# Patient Record
Sex: FEMALE | Race: WHITE | Employment: UNEMPLOYED | URBAN - METROPOLITAN AREA
[De-identification: names, ages, dates, MRNs, and addresses within clinical notes are randomized per-mention and may not be internally consistent; named-entity substitution may affect disease eponyms.]

---

## 2022-07-07 ENCOUNTER — INITIAL PRENATAL (OUTPATIENT)
Dept: OBGYN CLINIC | Age: 37
End: 2022-07-07
Payer: COMMERCIAL

## 2022-07-07 VITALS — WEIGHT: 138.2 LBS | BODY MASS INDEX: 25.43 KG/M2 | HEIGHT: 62 IN

## 2022-07-07 DIAGNOSIS — O36.80X0 ENCOUNTER TO DETERMINE FETAL VIABILITY OF PREGNANCY, SINGLE OR UNSPECIFIED FETUS: ICD-10-CM

## 2022-07-07 DIAGNOSIS — Z3A.09 9 WEEKS GESTATION OF PREGNANCY: ICD-10-CM

## 2022-07-07 DIAGNOSIS — F41.9 ANXIETY AND DEPRESSION: ICD-10-CM

## 2022-07-07 DIAGNOSIS — Z82.79 FAMILY HISTORY OF CONGENITAL HEART DEFECT: ICD-10-CM

## 2022-07-07 DIAGNOSIS — F32.A ANXIETY AND DEPRESSION: ICD-10-CM

## 2022-07-07 DIAGNOSIS — Z32.01 PREGNANCY TEST POSITIVE: ICD-10-CM

## 2022-07-07 DIAGNOSIS — Z34.81 PRENATAL CARE, SUBSEQUENT PREGNANCY, FIRST TRIMESTER: Primary | ICD-10-CM

## 2022-07-07 PROBLEM — O98.319 GENITAL HERPES AFFECTING PREGNANCY: Status: ACTIVE | Noted: 2022-07-07

## 2022-07-07 PROBLEM — O09.529 AMA (ADVANCED MATERNAL AGE) MULTIGRAVIDA 35+: Status: ACTIVE | Noted: 2022-07-07

## 2022-07-07 PROBLEM — A60.09 GENITAL HERPES AFFECTING PREGNANCY: Status: ACTIVE | Noted: 2022-07-07

## 2022-07-07 LAB
ABO + RH BLD: NORMAL
ABO, EXTERNAL RESULT: NORMAL
BASOPHILS # BLD: 0.1 K/UL (ref 0–0.2)
BASOPHILS NFR BLD: 1 % (ref 0–2)
BLOOD GROUP ANTIBODIES SERPL: NORMAL
DIFFERENTIAL METHOD BLD: NORMAL
EOSINOPHIL # BLD: 0.2 K/UL (ref 0–0.8)
EOSINOPHIL NFR BLD: 2 % (ref 0.5–7.8)
ERYTHROCYTE [DISTWIDTH] IN BLOOD BY AUTOMATED COUNT: 12.7 % (ref 11.9–14.6)
HBV SURFACE AG SER QL: NONREACTIVE
HCG, PREGNANCY, URINE, POC: POSITIVE
HCT VFR BLD AUTO: 38.1 % (ref 35.8–46.3)
HCV AB SER QL: NONREACTIVE
HEP B, EXTERNAL RESULT: NORMAL
HEPATITIS C ANTIBODY, EXTERNAL RESULT: NORMAL
HGB BLD-MCNC: 13 G/DL (ref 11.7–15.4)
HIV 1+2 AB+HIV1 P24 AG SERPL QL IA: NONREACTIVE
HIV 1/2 RESULT COMMENT: NORMAL
HIV, EXTERNAL RESULT: NORMAL
IMM GRANULOCYTES # BLD AUTO: 0 K/UL (ref 0–0.5)
IMM GRANULOCYTES NFR BLD AUTO: 0 % (ref 0–5)
LYMPHOCYTES # BLD: 2.2 K/UL (ref 0.5–4.6)
LYMPHOCYTES NFR BLD: 23 % (ref 13–44)
MCH RBC QN AUTO: 30.7 PG (ref 26.1–32.9)
MCHC RBC AUTO-ENTMCNC: 34.1 G/DL (ref 31.4–35)
MCV RBC AUTO: 89.9 FL (ref 79.6–97.8)
MONOCYTES # BLD: 0.7 K/UL (ref 0.1–1.3)
MONOCYTES NFR BLD: 8 % (ref 4–12)
NEUTS SEG # BLD: 6.4 K/UL (ref 1.7–8.2)
NEUTS SEG NFR BLD: 66 % (ref 43–78)
NRBC # BLD: 0 K/UL (ref 0–0.2)
PLATELET # BLD AUTO: 230 K/UL (ref 150–450)
PMV BLD AUTO: 10.8 FL (ref 9.4–12.3)
RBC # BLD AUTO: 4.24 M/UL (ref 4.05–5.2)
RH FACTOR, EXTERNAL RESULT: NEGATIVE
RPR, EXTERNAL RESULT: NORMAL
RUBELLA TITER, EXTERNAL RESULT: NORMAL
RUBV IGG SERPL IA-ACNC: 317.4 IU/ML (ref 0–50)
VALID INTERNAL CONTROL, POC: YES
WBC # BLD AUTO: 9.6 K/UL (ref 4.3–11.1)

## 2022-07-07 PROCEDURE — 76817 TRANSVAGINAL US OBSTETRIC: CPT | Performed by: OBSTETRICS & GYNECOLOGY

## 2022-07-07 PROCEDURE — 81025 URINE PREGNANCY TEST: CPT | Performed by: OBSTETRICS & GYNECOLOGY

## 2022-07-07 NOTE — PROGRESS NOTES
Effie Bowden G4, P2 presents to the office today for NOB talk and ultrasound. EDC is 2/4/23 based off of LMP. Patient education was discussed including: nutrition, appropriate weight gain, diet, exercise, travel, hospital classes, breastfeeding/lactation services, flu vaccine, Tdap, glucola, GBS, and Corona Virus and Zika precautions. Genetic testing discussed in depth and patient elects undecided. Patients past medical history is significant for genital herpes, AMA, anxiety/depression, FOB sister/niece born with heart defect. She is to return to the office in 3 weeks for NOB exam. All questions answered and she voiced full understanding. She is encouraged to call the office with any questions or concerns.

## 2022-07-10 LAB
BACTERIA SPEC CULT: NORMAL
SERVICE CMNT-IMP: NORMAL

## 2022-07-12 PROBLEM — Z67.91 RH NEGATIVE STATUS DURING PREGNANCY: Status: ACTIVE | Noted: 2022-07-12

## 2022-07-12 PROBLEM — O26.899 RH NEGATIVE STATUS DURING PREGNANCY: Status: ACTIVE | Noted: 2022-07-12

## 2022-07-12 LAB — RPR SER QL: NON REACTIVE

## 2022-08-04 ENCOUNTER — ROUTINE PRENATAL (OUTPATIENT)
Dept: OBGYN CLINIC | Age: 37
End: 2022-08-04
Payer: COMMERCIAL

## 2022-08-04 VITALS — BODY MASS INDEX: 25.97 KG/M2 | DIASTOLIC BLOOD PRESSURE: 58 MMHG | WEIGHT: 142 LBS | SYSTOLIC BLOOD PRESSURE: 104 MMHG

## 2022-08-04 DIAGNOSIS — Z3A.13 13 WEEKS GESTATION OF PREGNANCY: ICD-10-CM

## 2022-08-04 DIAGNOSIS — O09.522 MULTIGRAVIDA OF ADVANCED MATERNAL AGE IN SECOND TRIMESTER: Primary | ICD-10-CM

## 2022-08-04 DIAGNOSIS — Z12.4 CERVICAL CANCER SCREENING: ICD-10-CM

## 2022-08-04 DIAGNOSIS — Z11.3 SCREENING EXAMINATION FOR VENEREAL DISEASE: ICD-10-CM

## 2022-08-04 DIAGNOSIS — Z13.89 SCREENING FOR GENITOURINARY CONDITION: ICD-10-CM

## 2022-08-04 DIAGNOSIS — Z11.51 SCREENING FOR HPV (HUMAN PAPILLOMAVIRUS): ICD-10-CM

## 2022-08-04 LAB
C. TRACHOMATIS, EXTERNAL RESULT: NEGATIVE
GLUCOSE URINE, POC: NEGATIVE
N. GONORRHOEAE, EXTERNAL RESULT: NEGATIVE
PROTEIN,URINE, POC: NEGATIVE

## 2022-08-04 PROCEDURE — 99214 OFFICE O/P EST MOD 30 MIN: CPT | Performed by: OBSTETRICS & GYNECOLOGY

## 2022-08-04 NOTE — PROGRESS NOTES
Not on file   Social History Narrative    Not on file     Social Determinants of Health     Financial Resource Strain: Not on file   Food Insecurity: Not on file   Transportation Needs: Not on file   Physical Activity: Not on file   Stress: Not on file   Social Connections: Not on file   Intimate Partner Violence: Not on file   Housing Stability: Not on file       Family History:  Family History   Problem Relation Age of Onset    Heart Attack Maternal Grandmother     Heart Disease Maternal Grandmother     Thyroid Disease Sister     Heart Disease Father     Heart Disease Mother     Hypertension Mother     Alzheimer's Disease Maternal Great Grandmother     No Known Problems Maternal Grandfather     No Known Problems Paternal Grandmother     Heart Attack Paternal Grandfather        Review of Systems:     A comprehensive review of systems was negative except for that written in the history of present illness. BP (!) 104/58   Wt 142 lb (64.4 kg)   LMP 2022   BMI 25.97 kg/m²     See initial OB exam      Assessment and Plan:     Effie was seen today for routine prenatal visit. Diagnoses and all orders for this visit:    David Mandujano of advanced maternal age in second trimester  -     AMB POC OB URINE DIP    Screening for genitourinary condition  -     AMB POC OB URINE DIP    13 weeks gestation of pregnancy  -     AMB POC OB URINE DIP    Cervical cancer screening    Screening for HPV (human papillomavirus)    Screening examination for venereal disease          Counseling was received regarding adverse effects of alcohol, breast vs bottle feeding, childbirth classes, environment or work hazards, lifestyle changes, negative effects of smoking, physical activity, prenatal nutrition, sexual activity, toxoplasmosis precautions which includes avoidance of cat feces and raw material, traveling. Discussed prenatal care and questions answered.   Discussed  genetic testing options and she has decided to decline testing.

## 2022-08-04 NOTE — PROGRESS NOTES
New OB examination:  Last pap smear is unknown. Pap smear due today with cultures. Per patient she thinks it was 7-8 years ago. Declines genetic testing. No complaints.

## 2022-08-09 LAB
C TRACH RRNA CVX QL NAA+PROBE: NEGATIVE
CYTOLOGIST CVX/VAG CYTO: NORMAL
CYTOLOGY CVX/VAG DOC THIN PREP: NORMAL
HPV APTIMA: NEGATIVE
Lab: NORMAL
N GONORRHOEA RRNA CVX QL NAA+PROBE: NEGATIVE
PATH REPORT.FINAL DX SPEC: NORMAL
STAT OF ADQ CVX/VAG CYTO-IMP: NORMAL
T VAGINALIS RRNA SPEC QL NAA+PROBE: NEGATIVE

## 2022-08-10 ENCOUNTER — TELEPHONE (OUTPATIENT)
Dept: OBGYN CLINIC | Age: 37
End: 2022-08-10

## 2022-08-10 NOTE — TELEPHONE ENCOUNTER
OB patient-14w4d pregnant. Calling with concern for COVID. Had really bad HA beginning yesterday. Started running tep of 100-101. Taking tylenol. Chills and body aches. Congestion. Took home COVID test and was negative. Called patient back. Advised needs to see PCP or Urgent Care. If + COVID call back and let me know. All questions answered, patient v/u.

## 2022-08-15 ENCOUNTER — TELEPHONE (OUTPATIENT)
Dept: OBGYN CLINIC | Age: 37
End: 2022-08-15

## 2022-08-15 PROBLEM — O98.512 COVID-19 AFFECTING PREGNANCY IN SECOND TRIMESTER: Status: ACTIVE | Noted: 2022-08-15

## 2022-08-15 PROBLEM — U07.1 COVID-19 AFFECTING PREGNANCY IN SECOND TRIMESTER: Status: ACTIVE | Noted: 2022-08-15

## 2022-08-15 NOTE — TELEPHONE ENCOUNTER
Patient called back to state she did test + for COVID. She is feeling better. Now with a lot of nasal congestion. Went over med list with patient. If sx do not improve needs to f/u with PCP or Urgent care. To ED with SOB. Patient v/u.

## 2022-08-24 ENCOUNTER — TELEPHONE (OUTPATIENT)
Dept: OBGYN CLINIC | Age: 37
End: 2022-08-24

## 2022-08-24 PROBLEM — G43.909 MIGRAINE HEADACHE: Status: ACTIVE | Noted: 2022-08-24

## 2022-08-24 NOTE — TELEPHONE ENCOUNTER
OB patient 16w4d pregnant. She is calling with a migraine type HA. She has tried hydrating and XS tylenol. She is in tears on phone, states has not been able to sleep, has 2 other kids and interfering with her caring for them. She has not tried anything other than tylenol. Recommend patient try Excedrin Tension and Magnesium 300 mg twice daily. Per Dr. Lizzie Mayorga if this does not help could try Fioricet, and then referral to Neurology if Fioricet doesn't help. She states she had this same issue in previous pregnancy. Could not remember what she took that helped. She is advised to call back by Friday of this is not helping, and could try the Fioricet. All questions answered, patient v/u.

## 2022-09-01 ENCOUNTER — ROUTINE PRENATAL (OUTPATIENT)
Dept: OBGYN CLINIC | Age: 37
End: 2022-09-01
Payer: COMMERCIAL

## 2022-09-01 VITALS — WEIGHT: 145.6 LBS | DIASTOLIC BLOOD PRESSURE: 64 MMHG | BODY MASS INDEX: 26.63 KG/M2 | SYSTOLIC BLOOD PRESSURE: 102 MMHG

## 2022-09-01 DIAGNOSIS — M54.9 BACK PAIN, UNSPECIFIED BACK LOCATION, UNSPECIFIED BACK PAIN LATERALITY, UNSPECIFIED CHRONICITY: ICD-10-CM

## 2022-09-01 DIAGNOSIS — Z13.89 SCREENING FOR GENITOURINARY CONDITION: ICD-10-CM

## 2022-09-01 DIAGNOSIS — Z3A.17 17 WEEKS GESTATION OF PREGNANCY: ICD-10-CM

## 2022-09-01 DIAGNOSIS — Z34.82 PRENATAL CARE, SUBSEQUENT PREGNANCY, SECOND TRIMESTER: Primary | ICD-10-CM

## 2022-09-01 LAB
GLUCOSE URINE, POC: NEGATIVE
PROTEIN,URINE, POC: NEGATIVE

## 2022-09-01 PROCEDURE — 99214 OFFICE O/P EST MOD 30 MIN: CPT | Performed by: OBSTETRICS & GYNECOLOGY

## 2022-09-01 RX ORDER — ACETAMINOPHEN/CAFFEINE 500MG-65MG
2 TABLET ORAL EVERY 6 HOURS PRN
COMMUNITY

## 2022-09-01 NOTE — PROGRESS NOTES
C/o  back pain- has had with each pregnancy but much worse now, wants to go to PT. Referral made  Also continues to have headaches, states the magnesium pills are helping.  Use blue blockers as well

## 2022-09-14 ENCOUNTER — HOSPITAL ENCOUNTER (OUTPATIENT)
Dept: PHYSICAL THERAPY | Age: 37
Setting detail: RECURRING SERIES
Discharge: HOME | End: 2022-09-17
Payer: COMMERCIAL

## 2022-09-14 PROCEDURE — 97162 PT EVAL MOD COMPLEX 30 MIN: CPT

## 2022-09-14 ASSESSMENT — PAIN SCALES - GENERAL: PAINLEVEL_OUTOF10: 2

## 2022-09-14 NOTE — PROGRESS NOTES
treatment provided. Modalities (10 minutes): With pt in supported sitting position, moist heat (with 6-8 towel layers) applied to low back to reduce muscular tightness and pain. Treatment/Session Summary:    Manny Toledo has now attended 1 total therapy sessions (1 sessions since last assessment). Treatment Assessment:  Initial eval today - see assessment in chart. Instructed pt on lumbar extension at counter to mobilize SI joint for less pain. Communication/Consultation:  None today  Equipment provided today:  None  Recommendations/Intent for next treatment session: Next visit will focus on manual therapy/modalities as needed to reduce pain; addressing pelvic alignment, core strengthening, LE stretching.     Total Treatment Billable Duration:  0 minutes  Time In: 1687  Time Out: [address], PT       Charge Capture  }Post Session Pain  PT Visit Info  [employer] Portal  MD Guidelines  Scanned Media  Benefits  MyChart    Future Appointments   Date Time Provider Rafi [place]   9/21/2022  2:30 PM [facility] US 2 VD upobgyn [facility] AMB   9/21/2022  3:15 PM Francisca KENNEY Alt, DO upobgyn [facility]B

## 2022-09-21 ENCOUNTER — ROUTINE PRENATAL (OUTPATIENT)
Dept: OBGYN CLINIC | Age: 37
End: 2022-09-21
Payer: COMMERCIAL

## 2022-09-21 VITALS — SYSTOLIC BLOOD PRESSURE: 102 MMHG | BODY MASS INDEX: 27.33 KG/M2 | DIASTOLIC BLOOD PRESSURE: 54 MMHG | WEIGHT: 149.4 LBS

## 2022-09-21 DIAGNOSIS — Z3A.20 20 WEEKS GESTATION OF PREGNANCY: ICD-10-CM

## 2022-09-21 DIAGNOSIS — Z13.89 SCREENING FOR GENITOURINARY CONDITION: ICD-10-CM

## 2022-09-21 DIAGNOSIS — O09.522 MULTIGRAVIDA OF ADVANCED MATERNAL AGE IN SECOND TRIMESTER: ICD-10-CM

## 2022-09-21 DIAGNOSIS — Z36.3 ENCOUNTER FOR ROUTINE SCREENING FOR FETAL MALFORMATION USING ULTRASOUND: Primary | ICD-10-CM

## 2022-09-21 LAB
GLUCOSE URINE, POC: NEGATIVE
PROTEIN,URINE, POC: NEGATIVE

## 2022-09-21 PROCEDURE — 76805 OB US >/= 14 WKS SNGL FETUS: CPT | Performed by: OBSTETRICS & GYNECOLOGY

## 2022-09-21 PROCEDURE — 99213 OFFICE O/P EST LOW 20 MIN: CPT | Performed by: OBSTETRICS & GYNECOLOGY

## 2022-09-21 NOTE — PROGRESS NOTES
ptl precautions. rtc 4 weeks. Anatomy us with ac 95%. Cervical length wnl.  suboptimal view of heart. Repeat us next visit.

## 2022-09-26 ENCOUNTER — HOSPITAL ENCOUNTER (OUTPATIENT)
Dept: PHYSICAL THERAPY | Age: 37
Setting detail: RECURRING SERIES
Discharge: HOME | End: 2022-09-29
Payer: COMMERCIAL

## 2022-09-26 PROCEDURE — 97110 THERAPEUTIC EXERCISES: CPT

## 2022-09-26 PROCEDURE — 97140 MANUAL THERAPY 1/> REGIONS: CPT

## 2022-09-26 ASSESSMENT — PAIN SCALES - GENERAL: PAINLEVEL_OUTOF10: 1

## 2022-09-26 NOTE — PROGRESS NOTES
1/10Post Session:       0/10  Medications Last Reviewed:  9/26/2022  Updated Objective Findings:  None Today  Treatment   Therapeutic Exercise: ( 14 minutes):  Exercises per grid below to improve mobility, strength, and dynamic movement of lower back and bilateral legs to improve functional mobility . Required minimal visual, verbal, and manual cues to promote proper body alignment, promote proper body posture, promote proper body mechanics, and promote proper body breathing techniques. Progressed resistance, range, repetitions, and complexity of movement as indicated. Most recent tx:   Date:  9/26/2022 Date:   Date:   Activity/Exercise Parameters Parameters    Nustep L4 resistance for 10 minutes with B LEs and UEs to increase blood flow     Ankle plantarflexor stretch on incline board 30 second hold x 3 reps with knees extended then flexed;  B UE support     Sidelying hip clamshells 20 reps/1 set each LE with cues for slow lifting/lowering                             *given in HEP  [employer] Portal   Pt given following band colors: [] Yellow          [] Red          [] Green          [] Blue          [] Grey      Manual therapy (28 minutes): With pt in L sidelying position:   - STM and trigger point release to R quadratus lumborum, piriformis, upper gluteals, and gluteus medius/minimus to reduce muscular tightness and pain   - modified PA mobilizations (grade 2) at R SI joint to improve sacropelvic mobility and reduce pain    Modalities (    Treatment/Session Summary:    Mathew Pelayo has now attended 2 total therapy sessions (2 sessions since last assessment). Treatment Assessment:  Pt with several areas of significant tightness throughout R gluteals this session but this improved with manual therapy - pt reporting less pain at end of session.   Communication/Consultation:  None today  Equipment provided today:  None  Recommendations/Intent for next treatment session: Next visit will focus on manual therapy/modalities as needed to reduce pain; addressing pelvic alignment, core strengthening, LE stretching.     Total Treatment Billable Duration:  42 minutes  Time In: 9054  Time Out: 0930    Petr Jones, SAMUEL       Charge Capture  }Post Session Pain  PT Visit Info  [employer] Portal  MD Guidelines  Scanned Media  Benefits  MyChart    Future Appointments   Date Time Provider [place]i [place]   10/5/2022 11:00 AM Petr Jones PT [facility]   10/7/2022 10:15 AM SAMUEL Ventura [facility]   10/11/2022 11:00 AM Petr Jones PT [facility]   10/14/2022 10:15 AM SAMUEL Ventura   10/18/2022 10:30 AM [facility] US 1 VD upobgyn [facility] AMB   10/18/2022 11:00 AM Citlalli Miller MD uprangel MIRANDA AMB

## 2022-10-05 ENCOUNTER — HOSPITAL ENCOUNTER (OUTPATIENT)
Dept: PHYSICAL THERAPY | Age: 37
Setting detail: RECURRING SERIES
Discharge: HOME | End: 2022-10-08
Payer: COMMERCIAL

## 2022-10-05 PROCEDURE — 97110 THERAPEUTIC EXERCISES: CPT

## 2022-10-05 PROCEDURE — 97140 MANUAL THERAPY 1/> REGIONS: CPT

## 2022-10-05 ASSESSMENT — PAIN SCALES - GENERAL: PAINLEVEL_OUTOF10: 0

## 2022-10-05 NOTE — PROGRESS NOTES
Sarkisrusty WILBURN Dennise  : 1985  Primary: [facility]  Secondary:  [address] @ [address] [address]  Phone: [phone]  Fax: [phone] Plan Frequency: twice a week for 12 weeks    Plan of Care/Certification Expiration Date: 22      PT Visit Info: Total # of Visits Approved: 1 (eval only on 2022)    Visit Count:  3  Progress Note Counter: 3      OUTPATIENT PHYSICAL THERAPY:OP NOTE TYPE: Treatment Note 10/5/2022       Episode  }Appt Desk             Treatment Diagnosis:  Low back pain (M54.5)  Pain in right leg (M79.604)   Muscle weakness (generalized) (M62.81)   Medical/Referring Diagnosis:  Prenatal care, subsequent pregnancy, second trimester [Z34.82]  17 weeks gestation of pregnancy [Z3A.17]  Back pain, unspecified back location, unspecified back pain laterality, unspecified chronicity [M54.9]  Referring Physician:  Verena Monroe MD MD Orders:  PT Eval and Treat   Date of Onset:  Onset Date: 22 (beginning of August (no date specified))     Allergies:   Patient has no known allergies. Restrictions/Precautions:  No data recordedNo data recorded   Interventions Planned (Treatment may consist of any combination of the following):    Current Treatment Recommendations: Strengthening; ROM; Functional mobility training; Transfer training; Endurance training; Gait training; Stair training; Neuromuscular re-education; Manual Therapy - Soft Tissue Mobilization; Manual Therapy - Joint Manipulation; Pain management; Return to work related activity; Home exercise program; Patient/Caregiver education & training; Positioning; Modalities; Therapeutic activities     Subjective Comments:  Pt states she wasn't hurting as much after last session but overall the pain level stayed about the same. Pt states she did hurt more yesterday, but does remember that she vacuumed.   Initial:}    10Post Session:       4 (in L side of back, minimal to no pain in R side)/10  Medications Last Reviewed:  10/5/2022  Updated Objective Findings:  None Today  Treatment   Therapeutic Exercise: ( 12 minutes):  Exercises per grid below to improve mobility, strength, and dynamic movement of lower back and bilateral legs to improve functional mobility . Required minimal visual, verbal, and manual cues to promote proper body alignment, promote proper body posture, promote proper body mechanics, and promote proper body breathing techniques. Progressed resistance, range, repetitions, and complexity of movement as indicated. Most recent tx:   Date:  10/5/2022 Date:  9/26/2022 Date:   Activity/Exercise  Parameters    Nustep L4 resistance for 10 minutes with B LEs and UEs to increase blood flow L4 resistance for 10 minutes with B LEs and UEs to increase blood flow    Ankle plantarflexor stretch on incline board 30 second hold x 3 reps with knees extended then flexed;  B UE support 30 second hold x 3 reps with knees extended then flexed;  B UE support    Sidelying hip clamshells  20 reps/1 set each LE with cues for slow lifting/lowering                            *given in HEP  [employer] Portal   Pt given following band colors: [] Yellow          [] Red          [] Green          [] Blue          [] Grey      Manual therapy (58 minutes):   With pt in supported reclined supine position (wedge and pillows placed underneath trunk):   - R iliospoas release with passive hip ER/IR to reduce tightness and pain   - STM and trigger point release to R quadratus lumborum, piriformis, upper gluteals, and gluteus medius/minimus as well as cross friction massage at R TFL and ITB to reduce muscular tightness and pain  With pt in L sidelying position:   - STM and trigger point reelase to R>L gluteal musculature, lower portion of latissimus dorsi, and quadratus lumborum to reduce muscular tightness and pain    Modalities (    Treatment/Session Summary: Joseline Cheung has now attended 3 total therapy sessions (3 sessions since last assessment). Treatment Assessment:  Pt initially with significant tightness/tenderness in R iliospoas and upper gluteals and quadratus lumborum that reduced with manual therapy. She did report slight increase in L sided lower trunk pain toward end of manual therapy in reclined position, potentially due to positioning. Performed manual therapy in L sidelying to musculature on B sides of lower back to reduce pain at end of session. Communication/Consultation:  None today  Equipment provided today:  None  Recommendations/Intent for next treatment session: Next visit will focus on manual therapy/modalities as needed to reduce pain; addressing pelvic alignment, core strengthening, LE stretching.     Total Treatment Billable Duration:  70 minutes  Time In: 6107  Time Out: 1209    Melissa Hernadez, PT       Charge Capture  }Post Session Pain  PT Visit Info  [employer] Portal  MD Guidelines  Scanned Media  Benefits  MyChart    Future Appointments   Date Time Provider [place]i [place]   10/7/2022 10:15 AM Melissa Hernadez, PT [facility]   10/11/2022 11:00 AM Melissa Hernadez, PT [facility]   10/14/2022 10:15 AM Melissa Hernadez, PT [facility]   10/17/2022 10:15 AM Melissa Hernadez, PT [facility]   10/18/2022 10:30 AM [facility] US 1 MARCOS upobgybelinda [facility] AMB   10/18/2022 11:00 AM MD faraz Hodges GVAKILA AMB   10/20/2022 10:15 AM Melissa Hernadez, PT [facility]

## 2022-10-11 ENCOUNTER — HOSPITAL ENCOUNTER (OUTPATIENT)
Dept: PHYSICAL THERAPY | Age: 37
Setting detail: RECURRING SERIES
Discharge: HOME | End: 2022-10-14
Payer: COMMERCIAL

## 2022-10-11 PROCEDURE — 97110 THERAPEUTIC EXERCISES: CPT

## 2022-10-11 PROCEDURE — 97140 MANUAL THERAPY 1/> REGIONS: CPT

## 2022-10-11 ASSESSMENT — PAIN SCALES - GENERAL: PAINLEVEL_OUTOF10: 6

## 2022-10-14 ENCOUNTER — HOSPITAL ENCOUNTER (OUTPATIENT)
Dept: PHYSICAL THERAPY | Age: 37
Setting detail: RECURRING SERIES
Discharge: HOME | End: 2022-10-17
Payer: COMMERCIAL

## 2022-10-14 PROCEDURE — 97140 MANUAL THERAPY 1/> REGIONS: CPT

## 2022-10-14 PROCEDURE — 97110 THERAPEUTIC EXERCISES: CPT

## 2022-10-14 ASSESSMENT — PAIN SCALES - GENERAL: PAINLEVEL_OUTOF10: 1

## 2022-10-14 NOTE — PROGRESS NOTES
Effie Bowden  : 1985  Primary: [facility]  Secondary:  Vale Kitchen @ [address] [place] [address]  Phone: [phone]  Fax: [phone] Plan Frequency: twice a week for 12 weeks    Plan of Care/Certification Expiration Date: 22      PT Visit Info: Total # of Visits Approved: 24 (-)    Visit Count:  5  Progress Note Counter: 5  No Show: 1      OUTPATIENT PHYSICAL THERAPY:OP NOTE TYPE: Treatment Note 10/14/2022       Episode  }Appt Desk             Treatment Diagnosis:  Low back pain (M54.5)  Pain in right leg (M79.604)   Muscle weakness (generalized) (M62.81)   Medical/Referring Diagnosis:  Prenatal care, subsequent pregnancy, second trimester [Z34.82]  17 weeks gestation of pregnancy [Z3A.17]  Back pain, unspecified back location, unspecified back pain laterality, unspecified chronicity [M54.9]  Referring Physician:  Jae Lozada MD MD Orders:  PT Eval and Treat   Date of Onset:  Onset Date: 22 (beginning of August (no date specified))     Allergies:   Patient has no known allergies. Restrictions/Precautions:  No data recordedNo data recorded   Interventions Planned (Treatment may consist of any combination of the following):    Current Treatment Recommendations: Strengthening; ROM; Functional mobility training; Transfer training; Endurance training; Gait training; Stair training; Neuromuscular re-education; Manual Therapy - Soft Tissue Mobilization; Manual Therapy - Joint Manipulation; Pain management; Return to work related activity; Home exercise program; Patient/Caregiver education & training; Positioning; Modalities; Therapeutic activities     Subjective Comments:  Pt states her pain level isn't as bad today.   Initial:}    1 (0-1)/10Post Session:       2/10  Medications Last Reviewed:  10/14/2022  Updated Objective Findings:  None Today  Treatment   Therapeutic Exercise: ( 24 minutes):  Exercises per grid below to improve mobility, strength, and dynamic movement of lower back and bilateral legs to improve functional mobility . Required minimal visual, verbal, and manual cues to promote proper body alignment, promote proper body posture, promote proper body mechanics, and promote proper body breathing techniques. Progressed resistance, range, repetitions, and complexity of movement as indicated. Most recent tx:   Date:  10/14/2022 Date:  10/11/2022 Date:  10/5/2022   Activity/Exercise      Nustep L4 resistance for 10 minutes with B LEs and UEs to increase blood flow L4 resistance for 10 minutes with B LEs and UEs to increase blood flow L4 resistance for 10 minutes with B LEs and UEs to increase blood flow   Ankle plantarflexor stretch on incline board 30 second hold x 3 reps with knees extended then flexed;  B UE support 30 second hold x 3 reps with knees extended then flexed;  B UE support 30 second hold x 3 reps with knees extended then flexed;  B UE support   Sidelying hip clamshells      Sidelying open book  12 reps/1 set each direction with cues for initial technique     Child's pose to modified cobra 15 reps/1 set each direction with cues to avoid painful range - pt reporting initial stiffness but this improved with increased reps     Quadruped cat/camel 20 reps/1 set with 2-3 second hold each position and cues           *given in HEP  [employer] Portal   Pt given following band colors: [] Yellow          [] Red          [] Green          [] Blue          [] Grey      Manual therapy (30 minutes): With pt in L then in R sidelying position:   - STM and trigger point release to R>L gluteal musculature, lower portion of latissimus dorsi, and quadratus lumborum to reduce muscular tightness and pain    Modalities (    Treatment/Session Summary:    Janelle Max has now attended 5 total therapy sessions (5 sessions since last assessment).        Treatment Assessment:  Pt able to progress to more dynamic lumbar mobility exercises this session - reported increased stiffness initially but this improved with increased reps. She had no obvious pelvic asymmetry or LE length discrepancy this session. Communication/Consultation:  None today  Equipment provided today:  None  Recommendations/Intent for next treatment session: Next visit will focus on manual therapy/modalities as needed to reduce pain; addressing pelvic alignment, core strengthening, LE stretching.     Total Treatment Billable Duration:  54 minutes  Time In: 5530  Time Out: [address], PT       Charge Capture  }Post Session Pain  PT Visit Info  [employer] Portal  MD Guidelines  Scanned Media  Benefits  MyChart    Future Appointments   Date Time Provider Rafi [place]   10/17/2022 10:15 AM Marichuy Lainez, PT [facility] [place]   10/18/2022 10:30 AM [facility] US 1 MARCOS upobgyn [facility] AMB   10/18/2022 11:00 AM Chapito Bergman MD upobgyn [facility] AMB   10/20/2022 10:15 AM Marichuy Lainez PT [facility]   10/24/2022 10:15 AM Rome Dimas PTA [facility]   10/27/2022  9:30 AM Rome Dimas PTA [facility]   11/1/2022 10:15 AM Rome Dimas PTA [facility]   11/3/2022 10:15 AM Rome Dimas PTA [facility] [place]

## 2022-10-17 ENCOUNTER — HOSPITAL ENCOUNTER (OUTPATIENT)
Dept: PHYSICAL THERAPY | Age: 37
Setting detail: RECURRING SERIES
Discharge: HOME | End: 2022-10-20
Payer: COMMERCIAL

## 2022-10-17 PROCEDURE — 97140 MANUAL THERAPY 1/> REGIONS: CPT

## 2022-10-17 PROCEDURE — 97110 THERAPEUTIC EXERCISES: CPT

## 2022-10-17 ASSESSMENT — PAIN SCALES - GENERAL: PAINLEVEL_OUTOF10: 0

## 2022-10-17 NOTE — PROGRESS NOTES
Effie Bowden  : 1985  Primary: [facility]  Secondary:  Aries Watson @ [address] [place] [address]  Phone: [phone]  Fax: [phone] Plan Frequency: twice a week for 12 weeks    Plan of Care/Certification Expiration Date: 22      PT Visit Info: Total # of Visits Approved: 24 (-)    Visit Count:  6  Progress Note Counter: 6  No Show: 1      OUTPATIENT PHYSICAL THERAPY:OP NOTE TYPE: Treatment Note 10/17/2022       Episode  }Appt Desk             Treatment Diagnosis:  Low back pain (M54.5)  Pain in right leg (M79.604)   Muscle weakness (generalized) (M62.81)   Medical/Referring Diagnosis:  Prenatal care, subsequent pregnancy, second trimester [Z34.82]  17 weeks gestation of pregnancy [Z3A.17]  Back pain, unspecified back location, unspecified back pain laterality, unspecified chronicity [M54.9]  Referring Physician:  Darrel Amaya MD MD Orders:  PT Eval and Treat   Date of Onset:  Onset Date: 22 (beginning of August (no date specified))     Allergies:   Patient has no known allergies. Restrictions/Precautions:  No data recordedNo data recorded   Interventions Planned (Treatment may consist of any combination of the following):    Current Treatment Recommendations: Strengthening; ROM; Functional mobility training; Transfer training; Endurance training; Gait training; Stair training; Neuromuscular re-education; Manual Therapy - Soft Tissue Mobilization; Manual Therapy - Joint Manipulation; Pain management; Return to work related activity; Home exercise program; Patient/Caregiver education & training; Positioning; Modalities; Therapeutic activities     Subjective Comments:  Pt states she was not hurting as much after last session, and she was able to ride in the car for longer periods of time, but did report she had increased pain in her back with getting into and out of the car.   Initial:}    0/10Post Session: (pt stating no pain just tenderness)/10  Medications Last Reviewed:  10/17/2022  Updated Objective Findings:   L hip posteriorly rotated with L LE slightly longer functionally on 10/17/2022  Treatment   Therapeutic Exercise: ( 23 minutes):  Exercises per grid below to improve mobility, strength, and dynamic movement of lower back and bilateral legs to improve functional mobility . Required minimal visual, verbal, and manual cues to promote proper body alignment, promote proper body posture, promote proper body mechanics, and promote proper body breathing techniques. Progressed resistance, range, repetitions, and complexity of movement as indicated.                                    Most recent tx:   Date:  10/17/2022 Date:  10/14/2022 Date:  10/11/2022   Activity/Exercise      Nustep L4 resistance for 10 minutes with B LEs and UEs to increase blood flow L4 resistance for 10 minutes with B LEs and UEs to increase blood flow L4 resistance for 10 minutes with B LEs and UEs to increase blood flow   Ankle plantarflexor stretch on incline board 30 second hold x 3 reps with knees extended then flexed;  B UE support 30 second hold x 3 reps with knees extended then flexed;  B UE support 30 second hold x 3 reps with knees extended then flexed;  B UE support   Sidelying hip clamshells      Sidelying open book   12 reps/1 set each direction with cues for initial technique    Child's pose to modified cobra  15 reps/1 set each direction with cues to avoid painful range - pt reporting initial stiffness but this improved with increased reps    Quadruped cat/camel  20 reps/1 set with 2-3 second hold each position and cues    Sitting hamstring stretch 30 second hold x 3 reps each LE; cues to avoid lumbar flexion for improved stretch     Bridging in hooklying 3 reps/2 sets to assess leg length     SI belt stabilization trial Performed with gait belt and educated pt how to simulate at home to see if SI belt will be appropriate to reduce pain                 *given in HEP  MedBridge Portal   Pt given following band colors: [] Yellow          [] Red          [] Green          [] Blue          [] Grey      Manual therapy (17 minutes): With pt in sitting position:   - Performed modified muscle energy technique at pelvis (resisted R hip flexion and L hip extension and vice versa x 3 reps, followed by resisted hip abduction/adduction x 3 reps, and bridging x 3 reps) x 1 sets to improve pelvic symmetry and reduce muscular tightness and pain   With pt in R sidelying position:   - STM and trigger point release to L gluteal musculature, lower portion of latissimus dorsi, and quadratus lumborum to reduce muscular tightness and pain    Modalities (    Treatment/Session Summary:    Keith Giraldo has now attended 6 total therapy sessions (6 sessions since last assessment). Treatment Assessment:  Pt initially with pelvic asymmetry and associated leg length discrepancy that resolved with muscle energy technique. Educated pt on potential use of SI belt to improve pelvic stability and reduce symptoms. Will continue to work on lumbar and core stability to better support pt's pelvis and reduce her pain. Communication/Consultation:  None today  Equipment provided today:  None  Recommendations/Intent for next treatment session: Next visit will focus on manual therapy/modalities as needed to reduce pain; addressing pelvic alignment, core strengthening, LE stretching.     Total Treatment Billable Duration:  40 minutes  Time In: 4611  Time Out: [address]       Charge Capture  }Post Session Pain  PT Visit Info  MedBridge Portal  MD Guidelines  Scanned Media  Benefits  MyChart    Future Appointments   Date Time Provider Rafi Lake   10/18/2022 10:30 AM [facility]  [address] [facility] [facility]DA AMB   10/18/2022 11:00 AM MD faraz Chung AMB   10/20/2022 10:15 AM Marilin Hall PT [facility]   10/24/2022 10:15 AM Loraine Guallpa MARK [facility]   10/27/2022  9:30 AM Glenna Dang [facility]   11/1/2022 10:15 AM Glenna Dang [facility]   11/3/2022 10:15 AM Glenna Dang [facility]

## 2022-10-18 ENCOUNTER — ROUTINE PRENATAL (OUTPATIENT)
Dept: OBGYN CLINIC | Age: 37
End: 2022-10-18
Payer: COMMERCIAL

## 2022-10-18 VITALS — BODY MASS INDEX: 28.42 KG/M2 | WEIGHT: 155.4 LBS | DIASTOLIC BLOOD PRESSURE: 60 MMHG | SYSTOLIC BLOOD PRESSURE: 102 MMHG

## 2022-10-18 DIAGNOSIS — O09.522 MULTIGRAVIDA OF ADVANCED MATERNAL AGE IN SECOND TRIMESTER: ICD-10-CM

## 2022-10-18 DIAGNOSIS — Z36.2 ENCOUNTER FOR FOLLOW-UP ULTRASOUND OF FETAL ANATOMY: Primary | ICD-10-CM

## 2022-10-18 DIAGNOSIS — Z13.89 SCREENING FOR GENITOURINARY CONDITION: ICD-10-CM

## 2022-10-18 DIAGNOSIS — O28.3 ABNORMAL ULTRASONIC FINDING ON ANTENATAL SCREENING OF MOTHER: Primary | ICD-10-CM

## 2022-10-18 DIAGNOSIS — Z3A.24 24 WEEKS GESTATION OF PREGNANCY: ICD-10-CM

## 2022-10-18 LAB
GLUCOSE URINE, POC: NEGATIVE
PROTEIN,URINE, POC: NEGATIVE

## 2022-10-18 PROCEDURE — 99213 OFFICE O/P EST LOW 20 MIN: CPT | Performed by: OBSTETRICS & GYNECOLOGY

## 2022-10-18 PROCEDURE — 76816 OB US FOLLOW-UP PER FETUS: CPT | Performed by: OBSTETRICS & GYNECOLOGY

## 2022-10-18 NOTE — PROGRESS NOTES
Follow up anatomy US still sub optimal heart will refer to CYNDY Problem: Patient Care Overview (Adult)  Goal: Plan of Care Review  Outcome: Ongoing (interventions implemented as appropriate)    11/05/17 1285   Coping/Psychosocial Response Interventions   Plan Of Care Reviewed With patient;family   Outcome Evaluation   Outcome Summary/Follow up Plan pt remains anxious and tearful at times, medicated for c/o h/a x 2, up to chair this afternoon, generalized scabbing looks better today, because pt isn't picking at it as much, unable to keep pulse ox and heart monitor on much of shift because she pulls them off   Patient Care Overview   Progress no change         Problem: Fall Risk (Adult)  Goal: Absence of Falls  Outcome: Ongoing (interventions implemented as appropriate)    Problem: Pain, Acute (Adult)  Goal: Identify Related Risk Factors and Signs and Symptoms  Outcome: Ongoing (interventions implemented as appropriate)  Goal: Acceptable Pain Control/Comfort Level  Outcome: Ongoing (interventions implemented as appropriate)    Problem: Pressure Ulcer Risk (Gilles Scale) (Adult,Obstetrics,Pediatric)  Goal: Skin Integrity  Outcome: Ongoing (interventions implemented as appropriate)

## 2022-10-20 ENCOUNTER — HOSPITAL ENCOUNTER (OUTPATIENT)
Dept: PHYSICAL THERAPY | Age: 37
Setting detail: RECURRING SERIES
Discharge: HOME | End: 2022-10-23
Payer: COMMERCIAL

## 2022-10-20 PROCEDURE — 97110 THERAPEUTIC EXERCISES: CPT

## 2022-10-20 ASSESSMENT — PAIN SCALES - GENERAL: PAINLEVEL_OUTOF10: 4

## 2022-10-20 NOTE — PROGRESS NOTES
Effie Bowden  : 1985  Primary: [facility]  Secondary:  [address] @ [address] [address]  Phone: [phone]  Fax: [phone] Plan Frequency: twice a week for 12 weeks    Plan of Care/Certification Expiration Date: 22      PT Visit Info: Total # of Visits Approved: 24 (-)    Visit Count:  7  Progress Note Counter: 7  No Show: 1      OUTPATIENT PHYSICAL THERAPY:OP NOTE TYPE: Treatment Note 10/20/2022       Episode  }Appt Desk             Treatment Diagnosis:  Low back pain (M54.5)  Pain in right leg (M79.604)   Muscle weakness (generalized) (M62.81)   Medical/Referring Diagnosis:  Prenatal care, subsequent pregnancy, second trimester [Z34.82]  17 weeks gestation of pregnancy [Z3A.17]  Back pain, unspecified back location, unspecified back pain laterality, unspecified chronicity [M54.9]  Referring Physician:  Feroz Lopez MD MD Orders:  PT Eval and Treat   Date of Onset:  Onset Date: 22 (beginning of August (no date specified))     Allergies:   Patient has no known allergies. Restrictions/Precautions:  No data recordedNo data recorded   Interventions Planned (Treatment may consist of any combination of the following):    Current Treatment Recommendations: Strengthening; ROM; Functional mobility training; Transfer training; Endurance training; Gait training; Stair training; Neuromuscular re-education; Manual Therapy - Soft Tissue Mobilization; Manual Therapy - Joint Manipulation; Pain management; Return to work related activity; Home exercise program; Patient/Caregiver education & training; Positioning; Modalities; Therapeutic activities     Subjective Comments:  Pt reports feeling discomfort around groin/pelvic area more on the right. Overall soreness from last therapy session.   Initial:}    4/10Post Session:       6/10  Medications Last Reviewed:  10/20/2022  Updated Objective Findings:   none today  Treatment   Therapeutic Exercise: ( 60 minutes):  Exercises per grid below to improve mobility, strength, and dynamic movement of lower back and bilateral legs to improve functional mobility . Required minimal visual, verbal, and manual cues to promote proper body alignment, promote proper body posture, promote proper body mechanics, and promote proper body breathing techniques. Progressed resistance, range, repetitions, and complexity of movement as indicated.                                    Most recent tx:   Date:   10-20-22 Date:  10/17/2022 Date:  10/14/2022 Date:  10/11/2022   Activity/Exercise       Nustep L1 resistance for 10 minutes with B LEs and UEs to increase blood flow   L4 resistance for 10 minutes with B LEs and UEs to increase blood flow L4 resistance for 10 minutes with B LEs and UEs to increase blood flow L4 resistance for 10 minutes with B LEs and UEs to increase blood flow   Ankle plantarflexor stretch on incline board 30 second hold x 3 reps with knees extended then flexed;  B UE support   30 second hold x 3 reps with knees extended then flexed;  B UE support 30 second hold x 3 reps with knees extended then flexed;  B UE support 30 second hold x 3 reps with knees extended then flexed;  B UE support   Sidelying hip clamshells 2 x 10 ea       Sidelying open book  2 x 10 ea   12 reps/1 set each direction with cues for initial technique    Child's pose to modified cobra   15 reps/1 set each direction with cues to avoid painful range - pt reporting initial stiffness but this improved with increased reps    Quadruped cat/camel   20 reps/1 set with 2-3 second hold each position and cues    Sitting hamstring stretch 30 second hold x 3 reps each LE; cues to avoid lumbar flexion for improved stretch   30 second hold x 3 reps each LE; cues to avoid lumbar flexion for improved stretch     Bridging in hooklying 3 x 5    3 reps/2 sets to assess leg length     SI belt stabilization trial Sent gait belt home for her to use til she finds her gait belt or inquires about a pregnancy SI belt  Performed with gait belt and educated pt how to simulate at home to see if SI belt will be appropriate to reduce pain     Seated pelvic shifts with stability pads  5x ea   Fwd/back  Side to side   CW/CC   Green pad      Pt Ed on pelvic stability, COG, and weight shifting 5 mins       *given in HEP  MedBridge Portal   Pt given following band colors: [] Yellow          [] Red          [] Green          [] Blue          [] Grey      Manual therapy (0 minutes): With pt in sitting position:   - Performed modified muscle energy technique at pelvis (resisted R hip flexion and L hip extension and vice versa x 3 reps, followed by resisted hip abduction/adduction x 3 reps, and bridging x 3 reps) x 1 sets to improve pelvic symmetry and reduce muscular tightness and pain   With pt in R sidelying position:   - STM and trigger point release to L gluteal musculature, lower portion of latissimus dorsi, and quadratus lumborum to reduce muscular tightness and pain    Modalities     Treatment/Session Summary:    Effie Bowden has now attended 7 total therapy sessions (7 sessions since last assessment). Treatment Assessment:  Pt tolerated therapeutic exercises well with progression in reps. Pt responded well to weight shifting exercises to help stabilize pelvic. Plan to build on weight shifting and stability activities. Pt stated that new exercises were challenging and felt good but lead to additonal fatigue which caused the slight increase in pain. Communication/Consultation:  None today  Equipment provided today:  None  Recommendations/Intent for next treatment session: Next visit will focus on addressing pelvic alignment, core strengthening, LE stretching.     Total Treatment Billable Duration:  60 minutes  Time In: 4357  Time Out: [address], [place]  Vineet Rhoades PTA       Charge Capture  }Post Session Pain PT Visit [address] Portal  MD Guidelines  Scanned Media  Benefits  MyChart    Future Appointments   Date Time Provider Rafi Lake   10/24/2022 10:15 AM Elizabeth Long, [facility]   10/27/2022  9:30 AM Elizabeth Long, [facility]   11/1/2022 10:15 AM Elizabeth Long, [facility]   11/3/2022 10:15 AM Elizabeth Long, [facility]   11/11/2022  8:45 AM [facility] ULTRASOUND 2 [facility] AMB   11/11/2022 10:00 AM Tom Wood MD [facility] AMB   11/14/2022  9:30 AM [facility]   11/14/2022 10:15 [facility]

## 2022-10-24 ENCOUNTER — HOSPITAL ENCOUNTER (OUTPATIENT)
Dept: PHYSICAL THERAPY | Age: 37
Setting detail: RECURRING SERIES
Discharge: HOME | End: 2022-10-27
Payer: COMMERCIAL

## 2022-10-24 PROCEDURE — 97140 MANUAL THERAPY 1/> REGIONS: CPT

## 2022-10-24 PROCEDURE — 97110 THERAPEUTIC EXERCISES: CPT

## 2022-10-24 ASSESSMENT — PAIN SCALES - GENERAL: PAINLEVEL_OUTOF10: 6

## 2022-10-24 NOTE — PROGRESS NOTES
Effie Bowden  : 1985  Primary: [facility]  Secondary:  [address] @ [address] [address]  Phone: [phone]  Fax: [phone] Plan Frequency: twice a week for 12 weeks    Plan of Care/Certification Expiration Date: 22      PT Visit Info: Total # of Visits Approved: 24 (-)    Visit Count:  8  Progress Note Counter: 8  No Show: 1      OUTPATIENT PHYSICAL THERAPY:OP NOTE TYPE: Treatment Note 10/24/2022       Episode  }Appt Desk             Treatment Diagnosis:  Low back pain (M54.5)  Pain in right leg (M79.604)   Muscle weakness (generalized) (M62.81)   Medical/Referring Diagnosis:  Prenatal care, subsequent pregnancy, second trimester [Z34.82]  17 weeks gestation of pregnancy [Z3A.17]  Back pain, unspecified back location, unspecified back pain laterality, unspecified chronicity [M54.9]  Referring Physician:  Lenny Polk MD MD Orders:  PT Eval and Treat   Date of Onset:  Onset Date: 22 (beginning of August (no date specified))     Allergies:   Patient has no known allergies. Restrictions/Precautions:  No data recordedNo data recorded   Interventions Planned (Treatment may consist of any combination of the following):    Current Treatment Recommendations: Strengthening; ROM; Functional mobility training; Transfer training; Endurance training; Gait training; Stair training; Neuromuscular re-education; Manual Therapy - Soft Tissue Mobilization; Manual Therapy - Joint Manipulation; Pain management; Return to work related activity; Home exercise program; Patient/Caregiver education & training; Positioning; Modalities; Therapeutic activities     Subjective Comments:  Pt reports feeling some discomfort and pain from weekend in pelvic area. Stated SI Belt helped with stability.   Initial:}    6/10Post Session:        (2-3)/10  Medications Last Reviewed:  10/24/2022  Updated Objective Findings:   none today  Treatment   Therapeutic Exercise: ( 18 minutes):  Exercises per grid below to improve mobility, strength, and dynamic movement of lower back and bilateral legs to improve functional mobility . Required minimal visual, verbal, and manual cues to promote proper body alignment, promote proper body posture, promote proper body mechanics, and promote proper body breathing techniques. Progressed resistance, range, repetitions, and complexity of movement as indicated.                                    Most recent tx:   Date:  10-24-22 Date:   10-20-22 Date:  10/17/2022 Date:  10/14/2022 Date:  10/11/2022   Activity/Exercise        Nustep L1 resistance for 10 minutes with B LEs and UEs to increase blood flow L1 resistance for 10 minutes with B LEs and UEs to increase blood flow   L4 resistance for 10 minutes with B LEs and UEs to increase blood flow L4 resistance for 10 minutes with B LEs and UEs to increase blood flow L4 resistance for 10 minutes with B LEs and UEs to increase blood flow   Ankle plantarflexor stretch on incline board 30 second hold x 3 reps with knees extended then flexed;  B UE support 30 second hold x 3 reps with knees extended then flexed;  B UE support   30 second hold x 3 reps with knees extended then flexed;  B UE support 30 second hold x 3 reps with knees extended then flexed;  B UE support 30 second hold x 3 reps with knees extended then flexed;  B UE support   Sidelying hip clamshells 2 x 10 2 x 10 ea       Sidelying open book   2 x 10 ea   12 reps/1 set each direction with cues for initial technique    Child's pose to modified cobra    15 reps/1 set each direction with cues to avoid painful range - pt reporting initial stiffness but this improved with increased reps    Quadruped cat/camel    20 reps/1 set with 2-3 second hold each position and cues    Sitting hamstring stretch  30 second hold x 3 reps each LE; cues to avoid lumbar flexion for improved stretch   30 second hold x 3 reps each LE; cues to avoid lumbar flexion for improved stretch     Bridging in hooklying  3 x 5    3 reps/2 sets to assess leg length     SI belt stabilization trial  Sent gait belt home for her to use til she finds her gait belt or inquires about a pregnancy SI belt  Performed with gait belt and educated pt how to simulate at home to see if SI belt will be appropriate to reduce pain     Seated pelvic shifts with stability pads   5x ea   Fwd/back  Side to side   CW/CC   Green pad      Pt Ed on pelvic stability, COG, and weight shifting  5 mins       *given in HEP  MedBridge Portal   Pt given following band colors: [] Yellow          [] Red          [] Green          [] Blue          [] Grey      Manual therapy (30 minutes): With pt in sitting position:   - Performed modified muscle energy technique at pelvis (resisted R hip flexion and L hip extension and vice versa x 3 reps, followed by resisted hip abduction/adduction x 3 reps, and bridging x 3 reps) x 1 sets to improve pelvic symmetry and reduce muscular tightness and pain - NOT TODAY 10-  With pt in L side lying position:   - STM and trigger point release to R gluteal musculature and piriformis with use of hands and gua sha tool;  and strumming, manual stretching, and muscle energy work to R hip flexors for increase in mobility and decrease in pain/discomfort. Treatment/Session Summary:    Mame Doran has now attended 8 total therapy sessions (8 sessions since last assessment). Treatment Assessment:  Patient educated on better and safer techniques of entering and exting her car. She reports she ordered a pregnancy SI belt that should arrive today. She reports she will bring it to her next visit. Educated patient on body awareness and changes that will occur during pregnancy. Continue with pelvic and core stability.   Communication/Consultation:  None today  Equipment provided today:  None  Recommendations/Intent for next treatment session: Next visit will focus on addressing pelvic alignment, core strengthening, LE stretching. Total Treatment Billable Duration:  48 minutes  Time In: 1010  Time Out: [address] [place]  Ivan Kaplan, [address]       Charge Capture  }Post Session Pain  PT Visit Info  Sabrina Araya Portal  MD Guidelines  Scanned Media  Benefits  MyChart    Future Appointments   Date Time Provider Rafi Lake   10/27/2022  9:30 AM Ivan Kaplan PTA [facility]   11/1/2022 10:15 AM Claudine Marinelli [facility]   11/3/2022 10:15 AM Ivan Kaplan PTA [facility]   11/11/2022  8:45 AM [facility] ULTRASOUND 2 [facility] AMB   11/11/2022 10:00 AM Ariadna Kitchen MD [facility] AM[facility]   11/14/2022  9:30 AM [facility]   11/14/2022 10:15 AM Laine Cobian MD [facility] AMB

## 2022-10-27 ENCOUNTER — HOSPITAL ENCOUNTER (OUTPATIENT)
Dept: PHYSICAL THERAPY | Age: 37
Setting detail: RECURRING SERIES
Discharge: HOME | End: 2022-10-30
Payer: COMMERCIAL

## 2022-10-27 PROCEDURE — 97110 THERAPEUTIC EXERCISES: CPT

## 2022-10-27 ASSESSMENT — PAIN SCALES - GENERAL: PAINLEVEL_OUTOF10: 2

## 2022-10-27 NOTE — PROGRESS NOTES
Effie AKILA Dennise  : 1985  Primary: [facility]  Secondary:  [address] @ [address] [address]  Phone: [phone]  Fax: [phone] Plan Frequency: twice a week for 12 weeks    Plan of Care/Certification Expiration Date: 22      PT Visit Info: Total # of Visits Approved: 24 (-)    Visit Count:  9  Progress Note Counter: 9  No Show: 1      OUTPATIENT PHYSICAL THERAPY:OP NOTE TYPE: Treatment Note 10/27/2022       Episode  }Appt Desk             Treatment Diagnosis:  Low back pain (M54.5)  Pain in right leg (M79.604)   Muscle weakness (generalized) (M62.81)   Medical/Referring Diagnosis:  Prenatal care, subsequent pregnancy, second trimester [Z34.82]  17 weeks gestation of pregnancy [Z3A.17]  Back pain, unspecified back location, unspecified back pain laterality, unspecified chronicity [M54.9]  Referring Physician:  Yobany Finnegan MD MD Orders:  PT Eval and Treat   Date of Onset:  Onset Date: 22 (beginning of August (no date specified))     Allergies:   Patient has no known allergies. Restrictions/Precautions:  No data recordedNo data recorded   Interventions Planned (Treatment may consist of any combination of the following):    Current Treatment Recommendations: Strengthening; ROM; Functional mobility training; Transfer training; Endurance training; Gait training; Stair training; Neuromuscular re-education; Manual Therapy - Soft Tissue Mobilization; Manual Therapy - Joint Manipulation; Pain management; Return to work related activity; Home exercise program; Patient/Caregiver education & training; Positioning; Modalities; Therapeutic activities     Subjective Comments:  Pt reports feeling well. Patient presents with her own pregnancy support belt. Pt has been getting better sleep which has lead to having more energy.   Initial:}    2/10Post Session:       3/10  Medications Last Reviewed: 10/27/2022  Updated Objective Findings:   none today  Treatment   Therapeutic Exercise: ( 45 minutes):  Exercises per grid below to improve mobility, strength, and dynamic movement of lower back and bilateral legs to improve functional mobility . Required minimal visual, verbal, and manual cues to promote proper body alignment, promote proper body posture, promote proper body mechanics, and promote proper body breathing techniques. Progressed resistance, range, repetitions, and complexity of movement as indicated.                                    Most recent tx:   Date:  10-27-22 Date:  10-24-22 Date:   10-20-22 Date:  10/17/2022 Date:  10/14/2022 Date:  10/11/2022   Activity/Exercise         Nustep L4 resistance for 10 minutes with B LEs and UEs to increase blood flow   L1 resistance for 10 minutes with B LEs and UEs to increase blood flow L1 resistance for 10 minutes with B LEs and UEs to increase blood flow   L4 resistance for 10 minutes with B LEs and UEs to increase blood flow L4 resistance for 10 minutes with B LEs and UEs to increase blood flow L4 resistance for 10 minutes with B LEs and UEs to increase blood flow   Ankle plantarflexor stretch on incline board 30 second hold x 3 reps with knees extended then flexed;  B UE support   30 second hold x 3 reps with knees extended then flexed;  B UE support 30 second hold x 3 reps with knees extended then flexed;  B UE support   30 second hold x 3 reps with knees extended then flexed;  B UE support 30 second hold x 3 reps with knees extended then flexed;  B UE support 30 second hold x 3 reps with knees extended then flexed;  B UE support   Sidelying hip clamshells 3 x 10 ea   Mild discomfort on right side during last set  2 x 10 2 x 10 ea       Sidelying open book  2 x 10 ea   2 x 10 ea   12 reps/1 set each direction with cues for initial technique    Child's pose to modified cobra     15 reps/1 set each direction with cues to avoid painful range - pt reporting initial stiffness but this improved with increased reps    Quadruped cat/camel     20 reps/1 set with 2-3 second hold each position and cues    Sitting hamstring stretch 30 second hold x 3 reps each LE; cues to avoid lumbar flexion for improved stretch  30 second hold x 3 reps each LE; cues to avoid lumbar flexion for improved stretch   30 second hold x 3 reps each LE; cues to avoid lumbar flexion for improved stretch     Bridging in hooklying 2 x 10   3 x 5    3 reps/2 sets to assess leg length     SI belt stabilization trial   Sent gait belt home for her to use til she finds her gait belt or inquires about a pregnancy SI belt  Performed with gait belt and educated pt how to simulate at home to see if SI belt will be appropriate to reduce pain     Seated pelvic shifts with stability pads  2 sets   5x ea   Fwd/back  Side to side   CW/CC   Green pad  5x ea   Fwd/back  Side to side   CW/CC   Green pad      Pt Ed on pelvic stability, COG, and weight shifting   5 mins       *given in HEP  [facility] Portal   Pt given following band colors: [] Yellow          [] Red          [] Green          [] Blue          [] Grey      Manual therapy (0 minutes): With pt in sitting position:   - Performed modified muscle energy technique at pelvis (resisted R hip flexion and L hip extension and vice versa x 3 reps, followed by resisted hip abduction/adduction x 3 reps, and bridging x 3 reps) x 1 sets to improve pelvic symmetry and reduce muscular tightness and pain - NOT TODAY 10-  With pt in L side lying position:   - STM and trigger point release to R gluteal musculature and piriformis with use of hands and gua sha tool;  and strumming, manual stretching, and muscle energy work to R hip flexors for increase in mobility and decrease in pain/discomfort. Treatment/Session Summary:    Wallace Collier has now attended 9 total therapy sessions (9 sessions since last assessment).        Treatment Assessment:  Pt responded well to therapeutic exercises. Pt was able to go through stability exercises with more control and less cueing on posture. Plan to continue building on stability/cog/balance exercises as well as strengthening. Communication/Consultation:  None today  Equipment provided today:  None  Recommendations/Intent for next treatment session: Next visit will focus on addressing pelvic alignment, core strengthening, LE stretching. Total Treatment Billable Duration:  45 minutes  Time In: 1380  Time Out: [address], [address] [place] [address]       Charge Capture  }Post Session Pain  PT Visit Info  [address] Portal  MD Guidelines  Scanned Media  Benefits  MyChart    Future Appointments   Date Time Provider Rafi Aleksandra   11/1/2022 10:15 AM Claudine [facility]   11/3/2022 10:15 AM Rome Dimas PTA [facility]   11/11/2022  8:45 AM [facility] ULTRASOUND 2 [facility]   11/11/2022 10:00 AM Lv Tee MD [facility] AM[facility]   11/14/2022  9:30 AM [facility]   11/14/2022 10:15 AM Phyllis Mcclure MD [facility] AM[facility]

## 2022-11-01 ENCOUNTER — HOSPITAL ENCOUNTER (OUTPATIENT)
Dept: PHYSICAL THERAPY | Age: 37
Setting detail: RECURRING SERIES
Discharge: HOME | End: 2022-11-04
Payer: COMMERCIAL

## 2022-11-01 PROCEDURE — 97110 THERAPEUTIC EXERCISES: CPT

## 2022-11-01 PROCEDURE — 97140 MANUAL THERAPY 1/> REGIONS: CPT

## 2022-11-01 NOTE — THERAPY EVALUATION
Effie Bowden  : 1985  Primary: [facility]  Secondary:  [address] @ [address] [address]  Phone: [phone]  Fax: [phone] Plan Frequency: twice a week for 12 weeks  Plan of Care/Certification Expiration Date: 22      PT Visit Info: Total # of Visits Approved: 24 (-)  Total # of Visits to Date: 9  Progress Note Counter: 1  No Show: 1      Visit Count:  10    OUTPATIENT PHYSICAL THERAPY:OP NOTE TYPE: Initial Assessment 2022               Episode  Appt Desk         Treatment Diagnosis:  Low back pain (M54.5)  Pain in right leg (M79.604)   Muscle weakness (generalized) (M62.81)   Medical/Referring Diagnosis:  Prenatal care, subsequent pregnancy, second trimester [Z34.82]  17 weeks gestation of pregnancy [Z3A.17]  Back pain, unspecified back location, unspecified back pain laterality, unspecified chronicity [M54.9]  Referring Physician:  Wendy Shaw MD MD Orders:  PT Eval and Treat   Return MD Appt:  unspecified  Date of Onset:  Onset Date: 22 (beginning of August (no date specified))     Allergies:  Patient has no known allergies.   Restrictions/Precautions:    No data recordedNo data recorded   Medications Last Reviewed:  2022     SUBJECTIVE   History of Injury/Illness (Reason for Referral):  Location(s) of Injury: R SI joint, with pain going down R buttock; states some numbness wrapping around R anterior thigh; states occasional discomfort in R hamstring  Date of Injury: beginning of 2022  Mechanism of Injury: states no specific injury, just sequelae of pregnancy  Pain at Worst: 10/10 (pain takes about 10-30 minutes to go away)  Aggravating Activities/Functional Limitations: trying to lift things, twisting to either side, walking a lot (15-30 minutes), prolonged standing, sitting in \"slouched position\"  Pain at Best: 0/10 (pt states pain is not constant)  Easing Activities: sitting upright, lying down on either side  Other Pertinent Information: Pt states she has had 2 other pregnancies (both vaginal), no pain during the first pregnancy. States a couple of years after her first pregnancy (age 24) she started developing sciatic pain (about 21years old at this time) - states this does not happen all the time. States during her 2nd pregnancy (age 34) she started getting pain after about 5-6 months of pregnancy. States now she is pregnant again, and about 3-4 months she started getting pain in her R SI joint (currently 5 months pregnancy). States no pain at night in low back. Has body pillow that she uses when needed. States pain is worst in the afternoon (when she has been more active). Denies diastasis recti from previous pregnancies. States it feels like her hips are \"falling apart\". States she also started getting discomfort/pressure in her groin/medial hip region. Patient Stated Goal(s):  \"I just want my leg to not buckle from underneath me (to not fall)\"  Initial:      /10 Post Session:      /10  Past Medical History/Comorbidities:   Ms. Yousif Warren  has a past medical history of Abnormal Pap smear, Anxiety with depression, Genital herpes, unspecified, Herpes gestationis, and Migraine headache. Ms. Yousif Warren  has a past surgical history that includes dilation and evacuation of uterus. 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      OBJECTIVE   Initial Assessment on 9/14/2022  Observation/Orthostatic Postural Assessment:    The following postural deficits were noted in sitting: loss of cervical lordosis, increased thoracic kyphosis, forward head, rounded shoulders, and posterior pelvic tilt   The following postural deficits were noted in standing: loss of lumbar lordosis  Palpation:    Alignment in standing: R pelvis anteriorly rotated, L PSIS noted to be lower  Standing flexion test: R sacral sulci not deepening as much during lumbar flexion and R sidebending  Seated alignment: symmetry noted at iliac crests and PSIS  Long sit test: + (R LE longer in supine but corrected with long sitting)    ROM:    Initial measurement: (on 9/14/2022) Initial measurement: (on 9/14/2022)   WFL throughout L LE, but painful at end-range with following motions: hip flexion WFL throughout R LE, but painful at end range with following motions: hip flexion (more painful mid-range), hip ER        Initial measurement: (on 9/14/2022)   Lumbar AROM  Flexion (% of normal): 100%  Extension (% of normal): 100%  L sidebending (% of normal): 100%  R sidebending (% of normal): 100%     Strength:     Initial measurement: (on 9/14/2022) Initial measurement: (on 9/14/2022)  11/1/22 (L LE) 11/1/22 (R LE)    L LE: R LE:     Hip flexion 4+/5*  5/5   4+/5 (discomfort)    Hip ER 4/5 4+/5   3/5 (pain)   Hip IR 4-/5  4/5*   3/5 (discomfort)   Hip abduction 5/5  4/5   4+/5   Hip adduction 3-/5*  3-/5*      Hip extension NT NT     Knee flexion NT  NT      Knee extension 5/5  5/5      Ankle DF  4+/5  4+/5      *discomfort/pressure in groin  **pain in lateral R hip  Special Tests:          Donavon Hailstone test: + on L for increased tightness with pain at R SI joint; - on R  Trendelenberg: - B  Neurological Screen:        Myotomes:  WFL        Dermatomes:  WFL  Functional Mobility:   Gait/Ambulation: Pt ambulates with no AD with following gait deficits: slower gait speed, altered arm swing, decreased trunk rotation, narrow YANIQUE, decreased B step length, and antalgic gait         Transfers:  pushes up to stand, reaches back to sit; Joseph        Bed Mobility:  WFL, pt uses log roll technique to transition from supine to sit to supine  Balance:         Good in static/dynamic sitting; good in static standing; fair+ in dynamic standing  Sensation:         No deficits noted   ASSESSMENT   Progress Note (11/1/22): Pt has been seen for total of 10 PT sessions to date. Pt reports the stability of her R hip is feeling better but still having continued pain from glute and into groin region. Will continue PT for another 4 weeks and assess if any continued improvements. Pt will continue to benefit from skilled PT interventions at this time. INITIAL ASSESSMENT:  Adán Glynn is a 39 y.o. female who presents to physical therapy for low back pain associated with pregnancy. This session, pt demonstrated decreased B LE strength/endurance, pain with B hip mobility (especially flexion and ER at R), pelvic asymmetry with associated functional LE length discrepancy, multiple postural/gait deficits, decreased standing tolerance,  and decreased functional mobility as evident by a score of 15/50 on the Modified Oswestry Low Back Pain Questionnaire (with higher scores indicating increased disability). Pt may benefit from skilled PT to address the above listed deficits to improve ability to perform pain-free ADLs/IADLs and to improve overall quality of life prior to discharge. Problem List: (Impacting functional limitations):     Body Structures, Functions, Activity Limitations Requiring Skilled Therapeutic Intervention: Decreased functional mobility ; Decreased ROM; Decreased ADL status; Decreased body mechanics; Decreased tolerance to work activity; Decreased strength; Decreased endurance; Decreased high-level IADLs; Increased pain; Decreased posture     Therapy Prognosis:   Therapy Prognosis: Good     Assessment Complexity:      PLAN   Effective Dates: 9/14/2022 TO Plan of Care/Certification Expiration Date: 12/13/22     Frequency/Duration: Plan Frequency: twice a week for 12 weeks     Interventions Planned (Treatment may consist of any combination of the following):    Current Treatment Recommendations: Strengthening; ROM; Functional mobility training; Transfer training; Endurance training; Gait training; Stair training; Neuromuscular re-education; Manual Therapy - Soft Tissue Mobilization; Manual Therapy - Joint Manipulation; Pain management; Return to work related activity; Home exercise program; Patient/Caregiver education & training; Positioning; Modalities; Therapeutic activities     GOALS: (Goals have been discussed and agreed upon with patient.)  Short-Term Functional Goals: Time Frame: 6 weeks  Pt will be compliant with HEP in order to increase lumbar mobility and LE and core strength/endurance to improve quality of life. Goal met 11/1/22  Pt will reduce score on Modified Oswestry Low Back Pain Questionnaire to 12/50 in order to demonstrate improved functional mobility and quality of life. Not met 11/1/22  Pt will report standing for > 15 minutes with minimal to no increase in pain in order to be able to stand for prolonged periods as needed to perform chores around house. Progressing 11/1/22   Pt will be able to ambulate 500 feet over level surfaces with supervision with minimal to no increase in pain with least restrictive assistive device in order to improve household mobility.  Progressing 11/1/22  Discharge Goals: Time Frame: 12 weeks  Pt will be independent with HEP in order to increase lumbar mobility and LE and core strength/endurance to improve quality of life. Progressing 11/1/22  Pt will reduce score on Modified Oswestry Low Back Pain Questionnaire to 9/50 in order to demonstrate improved functional mobility and quality of life. Not met 11/1/22  Pt will report standing for > 25 minutes with minimal to no increase in pain in order to be able to stand for prolonged periods as needed to perform chores around house. Not met 11/1/22   Pt will be able to ambulate 1,000 feet over level and unlevel surfaces with independence with minimal to no increase in pain with least restrictive assistive device in order to improve community mobility Not met 11/1/22       OUTCOME MEASURE:   Modified Oswestry Low Back Pain Questionnaire:  Score:  Initial: 15/50  Most Recent: 21/50 (Date: 11/1/22 )   Interpretation of Score: Each section is scored on a 0-5 scale, 5 representing the greatest disability. The scores of each section are added together for a total score of 50. Medical Necessity:   > Patient is expected to demonstrate progress in strength, range of motion, and functional technique to improve ability to perform pain-free ADLs/IADLs, standing/ambulation and to improve overall quality of life. Reason For Services/Other Comments:  > Patient continues to require modification of therapeutic interventions to increase complexity of exercises. Total Duration:       Regarding Effie Bowden's therapy, I certify that the treatment plan above will be carried out by a therapist or under their direction. Thank you for this referral,  Artemio Pavon PT     Referring Physician Signature: Lenny Polk MD No Signature is Required for this note.         Post Session Pain  Charge Capture  PT Visit Info MD Guidelines  MyChart

## 2022-11-01 NOTE — PROGRESS NOTES
Effie Bowden  : 1985  Primary: [facility]  Secondary:  [address] @ [address] [address]  Phone: [phone]  Fax: [phone] Plan Frequency: twice a week for 12 weeks    Plan of Care/Certification Expiration Date: 22      PT Visit Info: Total # of Visits Approved: 24 (-)    Visit Count:  10  Progress Note Counter: 1  No Show: 1      OUTPATIENT PHYSICAL THERAPY:OP NOTE TYPE: Treatment Note 2022       Episode  }Appt Desk             Treatment Diagnosis:  Low back pain (M54.5)  Pain in right leg (M79.604)   Muscle weakness (generalized) (M62.81)   Medical/Referring Diagnosis:  Prenatal care, subsequent pregnancy, second trimester [Z34.82]  17 weeks gestation of pregnancy [Z3A.17]  Back pain, unspecified back location, unspecified back pain laterality, unspecified chronicity [M54.9]  Referring Physician:  Chele Zhang MD MD Orders:  PT Eval and Treat   Date of Onset:  Onset Date: 22 (beginning of August (no date specified))     Allergies:   Patient has no known allergies. Restrictions/Precautions:  No data recordedNo data recorded   Interventions Planned (Treatment may consist of any combination of the following):    Current Treatment Recommendations: Strengthening; ROM; Functional mobility training; Transfer training; Endurance training; Gait training; Stair training; Neuromuscular re-education; Manual Therapy - Soft Tissue Mobilization; Manual Therapy - Joint Manipulation; Pain management; Return to work related activity; Home exercise program; Patient/Caregiver education & training; Positioning; Modalities; Therapeutic activities     Subjective Comments: Pt reporting continued pain into groin and back of R hip (glute region). Reports feeling that strength around hip has improved.       Initial:}    5 /10 (into groin) Post Session:       2 /10  Medications Last Reviewed:  2022  Updated Objective Findings:   none today  Treatment   Therapeutic Exercise: ( 10 minutes):  Exercises per grid below to improve mobility, strength, and dynamic movement of lower back and bilateral legs to improve functional mobility . Required minimal visual, verbal, and manual cues to promote proper body alignment, promote proper body posture, promote proper body mechanics, and promote proper body breathing techniques. Progressed resistance, range, repetitions, and complexity of movement as indicated.                                    Most recent tx:   Date:  11/1/22 Date:  10-27-22 Date:  10-24-22 Date:   10-20-22 Date:  10/17/2022 Date:  10/14/2022 Date:  10/11/2022   Activity/Exercise          Nustep  L4 resistance for 10 minutes with B LEs and UEs to increase blood flow   L1 resistance for 10 minutes with B LEs and UEs to increase blood flow L1 resistance for 10 minutes with B LEs and UEs to increase blood flow   L4 resistance for 10 minutes with B LEs and UEs to increase blood flow L4 resistance for 10 minutes with B LEs and UEs to increase blood flow L4 resistance for 10 minutes with B LEs and UEs to increase blood flow   Ankle plantarflexor stretch on incline board  30 second hold x 3 reps with knees extended then flexed;  B UE support   30 second hold x 3 reps with knees extended then flexed;  B UE support 30 second hold x 3 reps with knees extended then flexed;  B UE support   30 second hold x 3 reps with knees extended then flexed;  B UE support 30 second hold x 3 reps with knees extended then flexed;  B UE support 30 second hold x 3 reps with knees extended then flexed;  B UE support   Sidelying hip clamshells 10 X 2  3 x 10 ea   Mild discomfort on right side during last set  2 x 10 2 x 10 ea       Sidelying open book   2 x 10 ea   2 x 10 ea   12 reps/1 set each direction with cues for initial technique    Child's pose to modified cobra      15 reps/1 set each direction with cues to avoid painful range - pt reporting initial stiffness but this improved with increased reps    Quadruped cat/camel 10 X      20 reps/1 set with 2-3 second hold each position and cues    Sitting hamstring stretch  30 second hold x 3 reps each LE; cues to avoid lumbar flexion for improved stretch  30 second hold x 3 reps each LE; cues to avoid lumbar flexion for improved stretch   30 second hold x 3 reps each LE; cues to avoid lumbar flexion for improved stretch     Bridging in hooklying  2 x 10   3 x 5    3 reps/2 sets to assess leg length     SI belt stabilization trial    Sent gait belt home for her to use til she finds her gait belt or inquires about a pregnancy SI belt  Performed with gait belt and educated pt how to simulate at home to see if SI belt will be appropriate to reduce pain     Seated pelvic shifts with stability pads  On SB 10 X 2 sets   5x ea   Fwd/back  Side to side   CW/CC   Green pad  5x ea   Fwd/back  Side to side   CW/CC   Green pad      Pt Ed on pelvic stability, COG, and weight shifting    5 mins         Manual therapy (29 minutes):  +manual stretching: R HS, ITB, piriformis, glute,hip flexor  +gentle distraction R LE  +STM to R piriformis, glute    Treatment/Session Summary:    Effie Bowden has now attended 10 total therapy sessions (1 sessions since last assessment). Treatment Assessment: Pt tolerated session fair. Found relief with manual techniques this session with pain improving to 2/10. Communication/Consultation:  None today  Equipment provided today:  None  Recommendations/Intent for next treatment session: Next visit will focus on addressing pelvic alignment, core strengthening, LE stretching.     Total Treatment Billable Duration:  39 minutes  Time In: 1300  Time Out: 1339      Jj Castellon PT       Charge Capture  }Post Session Pain  PT Visit Info  [employer] Portal  MD Guidelines  Scanned Media  Benefits  MyChart    Future Appointments   Date Time Provider Rafi Lake   11/3/2022

## 2022-11-03 ENCOUNTER — HOSPITAL ENCOUNTER (OUTPATIENT)
Dept: PHYSICAL THERAPY | Age: 37
Setting detail: RECURRING SERIES
Discharge: HOME | End: 2022-11-06
Payer: COMMERCIAL

## 2022-11-03 PROCEDURE — 97140 MANUAL THERAPY 1/> REGIONS: CPT

## 2022-11-03 PROCEDURE — 97110 THERAPEUTIC EXERCISES: CPT

## 2022-11-03 ASSESSMENT — PAIN SCALES - GENERAL: PAINLEVEL_OUTOF10: 4

## 2022-11-09 ENCOUNTER — HOSPITAL ENCOUNTER (OUTPATIENT)
Dept: PHYSICAL THERAPY | Age: 37
Setting detail: RECURRING SERIES
Discharge: HOME | End: 2022-11-12
Payer: COMMERCIAL

## 2022-11-09 PROBLEM — O98.312 GENITAL HERPES AFFECTING PREGNANCY IN SECOND TRIMESTER: Status: ACTIVE | Noted: 2022-07-07

## 2022-11-09 PROBLEM — O09.522 MULTIGRAVIDA OF ADVANCED MATERNAL AGE IN SECOND TRIMESTER: Status: ACTIVE | Noted: 2022-07-07

## 2022-11-09 PROBLEM — Z86.69 H/O MIGRAINE DURING PREGNANCY: Status: ACTIVE | Noted: 2022-08-24

## 2022-11-09 PROBLEM — Z87.59 H/O MIGRAINE DURING PREGNANCY: Status: ACTIVE | Noted: 2022-08-24

## 2022-11-09 PROCEDURE — 97110 THERAPEUTIC EXERCISES: CPT

## 2022-11-09 ASSESSMENT — PAIN SCALES - GENERAL: PAINLEVEL_OUTOF10: 5

## 2022-11-09 NOTE — PROGRESS NOTES
Effie Bowden  : 1985  Primary: [facility]  Secondary:  [address] @ [address] [address]  Phone: [phone]  Fax: [phone] Plan Frequency: twice a week for 12 weeks    Plan of Care/Certification Expiration Date: 22      PT Visit Info: Total # of Visits Approved: 24 (-)    Visit Count:  12  Progress Note Counter: 3  No Show: 1      OUTPATIENT PHYSICAL THERAPY:OP NOTE TYPE: Treatment Note 2022       Episode  }Appt Desk             Treatment Diagnosis:  Low back pain (M54.5)  Pain in right leg (M79.604)   Muscle weakness (generalized) (M62.81)   Medical/Referring Diagnosis:  Prenatal care, subsequent pregnancy, second trimester [Z34.82]  17 weeks gestation of pregnancy [Z3A.17]  Back pain, unspecified back location, unspecified back pain laterality, unspecified chronicity [M54.9]  Referring Physician:  Belia Andino MD MD Orders:  PT Eval and Treat   Date of Onset:  Onset Date: 22 (beginning of August (no date specified))     Allergies:   Patient has no known allergies. Restrictions/Precautions:  No data recordedNo data recorded   Interventions Planned (Treatment may consist of any combination of the following):    Current Treatment Recommendations: Strengthening; ROM; Functional mobility training; Transfer training; Endurance training; Gait training; Stair training; Neuromuscular re-education; Manual Therapy - Soft Tissue Mobilization; Manual Therapy - Joint Manipulation; Pain management; Return to work related activity; Home exercise program; Patient/Caregiver education & training; Positioning; Modalities; Therapeutic activities     Subjective Comments:   Patient reprots she is showing more since the weekend.   Initial:}    5/10 (into groin) Post Session:      3/10  Medications Last Reviewed:  2022  Updated Objective Findings:   none today  Treatment   Therapeutic Exercise: ( 45 minutes):  Exercises per grid below to improve mobility, strength, and dynamic movement of lower back and bilateral legs to improve functional mobility . Required minimal visual, verbal, and manual cues to promote proper body alignment, promote proper body posture, promote proper body mechanics, and promote proper body breathing techniques. Progressed resistance, range, repetitions, and complexity of movement as indicated.                                    Most recent tx:   Date:  11-9-2022 Date:  11-3-2022 Date:  11/1/22 Date:  10-27-22 Date:  10-24-22   Activity/Exercise        Nustep  Declined- too irritable   L4 resistance for 10 minutes with B LEs and UEs to increase blood flow   L1 resistance for 10 minutes with B LEs and UEs to increase blood flow   Ankle plantarflexor stretch on incline board    30 second hold x 3 reps with knees extended then flexed;  B UE support   30 second hold x 3 reps with knees extended then flexed;  B UE support   Sidelying hip clamshells 2 x 10 B Hooklying   2 x 10 R with manual resistance  10 X 2  3 x 10 ea   Mild discomfort on right side during last set  2 x 10   Sidelying open book  2 x 10 B   2 x 10 ea     Child's pose to modified cobra X 6 with hold        Quadruped cat/camel   10 X      Sitting hamstring stretch Supine with strap   3 x 30 sec hold B  Manual in supine   30 second hold x 3 reps each LE; cues to avoid lumbar flexion for improved stretch    Pelvic tilt  15 x 5 sec hold  10 x 5 sec hold       Bridging in hooklying With ball under legs   2 x 10  X 15   2 x 10     SI belt stabilization trial        Seated pelvic shifts with stability pads  Clock   -12/6  -3/9  Circles - CW/CCW  X 10 each   On SB 10 X 2 sets   5x ea   Fwd/back  Side to side   CW/CC   Green pad    Pt Ed on pelvic stability, COG, and weight shifting        Supine Marches  Seated on stability pad  X 20  2 x 10 ea       Supine Pelvic tilts 2 x 10 2 x 10       Heel slides   2 x 10 ea with pillow case to ease slide         Manual therapy (0 minutes):  +manual stretching: R HS, ITB, piriformis, glute,  +gentle mobilization in L side lying with stabilize pelvis. +STM to R piriformis, glute    Treatment/Session Summary:    Yu Zamudio has now attended 12 total therapy sessions (3 sessions since last assessment). Treatment Assessment:   Patient encouraged to use ball with bridging since it caused less pain. She is still having a good bit of groin pain. Communication/Consultation:  None today  Equipment provided today:  None  Recommendations/Intent for next treatment session: Next visit will focus on addressing pelvic alignment, core strengthening, LE stretching. Total Treatment Billable Duration:  45 minutes  Time In: 3296  Time Out: [place] [address], [facility]       Charge Capture  }Post Session Pain  PT Visit Info  [employer] Portal  MD Guidelines  Scanned Media  Benefits  MyChart    Future Appointments   Date Time Provider Rafi Lake   2022  8:45 AM [facility] ULTRASOUND 2 [facility]   2022 10:00 AM Christos Hughes MD [facility]   2022  1:00 PM Vineet Rhoades [facility]   2022  9:30 AM [facility] VD [facility]   2022 10:15 AM Brad Cabrera MD [facility]   11/15/2022 10:15 AM Vineet Rhoades [facility]   2022 11:00 AM Claudine CHATTERJEE, PT [facility]

## 2022-11-11 ENCOUNTER — ROUTINE PRENATAL (OUTPATIENT)
Dept: OBGYN CLINIC | Age: 37
End: 2022-11-11
Payer: COMMERCIAL

## 2022-11-11 ENCOUNTER — HOSPITAL ENCOUNTER (OUTPATIENT)
Dept: PHYSICAL THERAPY | Age: 37
Setting detail: RECURRING SERIES
Discharge: HOME | End: 2022-11-14
Payer: COMMERCIAL

## 2022-11-11 VITALS — SYSTOLIC BLOOD PRESSURE: 113 MMHG | DIASTOLIC BLOOD PRESSURE: 59 MMHG

## 2022-11-11 DIAGNOSIS — Z87.59 H/O MIGRAINE DURING PREGNANCY: ICD-10-CM

## 2022-11-11 DIAGNOSIS — Z82.79 FAMILY HISTORY OF CONGENITAL HEART DEFECT: ICD-10-CM

## 2022-11-11 DIAGNOSIS — O09.522 HIGH RISK PREGNANCY, MULTIGRAVIDA OF ADVANCED MATERNAL AGE IN SECOND TRIMESTER: Primary | ICD-10-CM

## 2022-11-11 DIAGNOSIS — A60.09 GENITAL HERPES AFFECTING PREGNANCY IN SECOND TRIMESTER: ICD-10-CM

## 2022-11-11 DIAGNOSIS — U07.1 COVID-19 AFFECTING PREGNANCY IN SECOND TRIMESTER: ICD-10-CM

## 2022-11-11 DIAGNOSIS — Z86.59 HISTORY OF ANXIETY: ICD-10-CM

## 2022-11-11 DIAGNOSIS — O98.312 GENITAL HERPES AFFECTING PREGNANCY IN SECOND TRIMESTER: ICD-10-CM

## 2022-11-11 DIAGNOSIS — Z3A.27 27 WEEKS GESTATION OF PREGNANCY: ICD-10-CM

## 2022-11-11 DIAGNOSIS — Z86.69 H/O MIGRAINE DURING PREGNANCY: ICD-10-CM

## 2022-11-11 DIAGNOSIS — O98.512 COVID-19 AFFECTING PREGNANCY IN SECOND TRIMESTER: ICD-10-CM

## 2022-11-11 PROCEDURE — 97110 THERAPEUTIC EXERCISES: CPT

## 2022-11-11 PROCEDURE — 96127 BRIEF EMOTIONAL/BEHAV ASSMT: CPT | Performed by: OBSTETRICS & GYNECOLOGY

## 2022-11-11 PROCEDURE — 97140 MANUAL THERAPY 1/> REGIONS: CPT

## 2022-11-11 PROCEDURE — 99203 OFFICE O/P NEW LOW 30 MIN: CPT | Performed by: OBSTETRICS & GYNECOLOGY

## 2022-11-11 ASSESSMENT — PATIENT HEALTH QUESTIONNAIRE - PHQ9
SUM OF ALL RESPONSES TO PHQ QUESTIONS 1-9: 0
2. FEELING DOWN, DEPRESSED OR HOPELESS: 0
SUM OF ALL RESPONSES TO PHQ9 QUESTIONS 1 & 2: 0
SUM OF ALL RESPONSES TO PHQ QUESTIONS 1-9: 0
1. LITTLE INTEREST OR PLEASURE IN DOING THINGS: 0

## 2022-11-11 ASSESSMENT — PAIN SCALES - GENERAL: PAINLEVEL_OUTOF10: 0

## 2022-11-11 NOTE — PROGRESS NOTES
MFM Consultation    Yvette Eddy (: 1985) is a 39 y.o. [id number] at 27w6d with 2023, by Last Menstrual Period. Presents for evaluation of the following chief complaint(s):  Pregnancy Ultrasound and High Risk Pregnancy (AMA, Hx Anxiety, HSV, Hx Migraine)     Patient is [facility]. Scheduled to see Primary OB ([facility]) on 22. No HAs, edema. Denies preeclamptic symptoms. Reports good fetal movement. No bleeding, LOF, cramping, ctxs, or vaginal pressure. Review of Systems - per HPI; otherwise unremarkable. History reviewed and updated as needed. Exam:   Vitals:    22 0933   BP: (!) 113/59       Ultrasound: Please see formal ultrasound report under imaging tab. ASSESSMENT/PLAN:  Patient Active Problem List    Diagnosis Date Noted    High risk pregnancy, multigravida of advanced maternal age in second trimester 2022     Priority: High     Undecided on genetic testing. GTT-  COVID-not vaccinated  Flu-  Tdap-  22 at [facility]: Strong FHx of congenital heart defects. Appropriate fetal growth; Normal anatomy and echo; AC 46%, Overall 53%, NINOSKA 11 cm, Dopplers WNL, CL 3 cm    No F/U MFM-refer back as needed        COVID-19 affecting pregnancy in second trimester 08/15/2022     Priority: Medium     Growth 32-34 weeks. Rh negative status during pregnancy 2022     Priority: Medium     ABS-    Rhogam-      Genital herpes affecting pregnancy in second trimester 2022     Priority: Medium     Valtrex at 36 weeks. 22 at [facility]:   Valtrex suppression with 500 mgs po bid starting at 36 weeks        Family history of congenital heart defect 2022     Priority: Medium     FOB-sister diagnosed as an adult, FOB niece born with heart defect-surgically repaired. H/O migraine during pregnancy 2022     Priority: Low     Patient has tried OTC meds. Recommended Excedrin tension and Mag 300 mg bid.         History of anxiety 2016     Priority: Low     Not on medication       1) Advanced Maternal Age (Maternal Age 28 or greater at [place])    First and Second Trimester  The risk of fetal aneuploidy based on maternal age should be reviewed with patient either with physicians or genetic counselor, or both. Testing for fetal aneuploidy can be divided into invasive and non-invasive tests. Invasive testing, which includes amniocentesis and chorionic villus sampling, is diagnostic. Non-invasive testing of maternal blood (for either hormone levels or cell-free fetal DNA), with or without ultrasound examination, is a screening test and requires follow-up testing of patients with screen-positive results. Given the increased risk of congenital anomalies in older women, a detailed second trimester ultrasound examination to assess for significant structural anomalies (particularly cardiac defects) is recommended. Other events that occur with increased frequency with increasing maternal age include hypertensive disease and preeclampsia, placenta previa, gestational diabetes, and placental abruption. Recommend educating women about these conditions. Additionally, I recommend daily 162mg ASA for early/severe preeclampsia prevention. Third Trimester  There are no large randomized trials that have examined the efficacy of routine antepartum testing in women age 28 and older, therefore there is no consensus on the management of late pregnancy for this population. One strategy is to stratify women based on their risk factors, such as age and parity, and to consider other factors that might influence risk, such as pregestational obesity and race. The risk of stillbirth increases with increasing maternal age such that women ? 36years of age have the same risk of stillbirth at 43 weeks of gestation as women in their mid-20s have at 36 weeks of gestation.  For this reason, we recommend beginning testing at 37 weeks in those 36years of age and older due to stillbirth risk.     Consider induction at 39 weeks of gestation for women who are ?28 years and primiparous, ?44years of age, of black race, or who have additional risk factors for stillbirth such as obesity. Women who decline induction should undergo  testing and daily kick counts until spontaneous labor, nonreassuring testing, or 41 weeks of gestation. 2) Mood evaluated today; PHQ2 reviewed. Risk of peripartum worsening. Mood Reassuring today     3) History of Genital Herpes    Patients with recurrent genital herpes are at risk of an outbreak at the time of labor. The newborns in these cases are at a low risk of lori the herpes virus due to immunity passed from the mother. Among women with recurrent lesions at the time of delivery, the rate of transmission with a vaginal delivery is only 3%. For women with a history of recurrent disease and no visible lesions at delivery, the transmission risk has been estimated to be 2/10,000. The low risk is in part attributed to the presence and transplacental passage of antiherpes antibodies.  delivery is not indicated in women with a history of HSV in the absence of active genital lesions or prodromes. Acyclovir and Valacyclovir have been repeatedly shown to decrease the incidence of recurrent HSV outbreak at the time of labor reducing the incidence of  delivery. The routine use of antiviral suppression in the third trimester has become the recommendation of ACOG in patients with a history of genital herpes. Valtrex suppression with 500 mgs po bid starting at 36 weeks is an acceptable option for the patient who is worried about a recurrent episode and wants to avoid a  delivery.        Addressed normal pregnancy complaints, reassured and offered suggestions for care  Reviewed gestational age precautions and activity goals/limitations  Nutritional counseling as well as specific goals based on current maternal and fetal status  Options for GERD therapy if becomes symptomatic over course of pregnancy  Gestational age appropriate preventive care regarding communicable disease transmission and vaccines as appropriate (including flu, TDaP, and COVID.)  Additional plans and concerns as documented in problem list.   All questions answered and concerns discussed. On this datevisitdate@ I have spent 35 minutes reviewing previous notes, test results and face to face with the patient discussing the diagnosis and importance of compliance with the treatment plan, in addition to ultrasound findings, as well as documenting on the day of the visit      An electronic signature was used to authenticate this note. Atif Easley MD    1. High risk pregnancy, multigravida of advanced maternal age in second trimester    2. Genital herpes affecting pregnancy in second trimester    3. History of anxiety    4. H/O migraine during pregnancy    5. Family history of congenital heart defect    6.  COVID-19 affecting pregnancy in second trimester    7. 27 weeks gestation of pregnancy

## 2022-11-11 NOTE — PROGRESS NOTES
Effie Bowden  : 1985  Primary: [facility]  Secondary:  [address] [address]  Phone: [phone]  Fax: [phone] Plan Frequency: twice a week for 12 weeks    Plan of Care/Certification Expiration Date: 22      PT Visit Info: Total # of Visits Approved: 24 (-)    Visit Count:  13  Progress Note Counter: 4  No Show: 1      OUTPATIENT PHYSICAL THERAPY:OP NOTE TYPE: Treatment Note 2022       Episode  }Appt Desk             Treatment Diagnosis:  Low back pain (M54.5)  Pain in right leg (M79.604)   Muscle weakness (generalized) (M62.81)   Medical/Referring Diagnosis:  Prenatal care, subsequent pregnancy, second trimester [Z34.82]  17 weeks gestation of pregnancy [Z3A.17]  Back pain, unspecified back location, unspecified back pain laterality, unspecified chronicity [M54.9]  Referring Physician:  Wendy Shaw MD MD Orders:  PT Eval and Treat   Date of Onset:  Onset Date: 22 (beginning of August (no date specified))     Allergies:   Patient has no known allergies. Restrictions/Precautions:  No data recordedNo data recorded   Interventions Planned (Treatment may consist of any combination of the following):    Current Treatment Recommendations: Strengthening; ROM; Functional mobility training; Transfer training; Endurance training; Gait training; Stair training; Neuromuscular re-education; Manual Therapy - Soft Tissue Mobilization; Manual Therapy - Joint Manipulation; Pain management; Return to work related activity; Home exercise program; Patient/Caregiver education & training; Positioning; Modalities; Therapeutic activities     Subjective Comments:   Patient stated she got a good report from the doctor today. Patient presents with a new SI support belt (Gourmant). She reports it is more comfortable.   Initial:}    0/10 (no shooting pain in groin) Post Session: 0/10  Medications Last Reviewed:  11/11/2022  Updated Objective Findings:   none today  Treatment   Therapeutic Exercise: ( 30 minutes):  Exercises per grid below to improve mobility, strength, and dynamic movement of lower back and bilateral legs to improve functional mobility . Required minimal visual, verbal, and manual cues to promote proper body alignment, promote proper body posture, promote proper body mechanics, and promote proper body breathing techniques. Progressed resistance, range, repetitions, and complexity of movement as indicated.                                    Most recent tx:   Date:  11- Date:  11-9-2022 Date:  11-3-2022 Date:  11/1/22 Date:  10-27-22 Date:  10-24-22   Activity/Exercise         Nustep L2 Level   8 minutes   - patient stiff and wanted to loosen up before stretches   Declined- too irritable   L4 resistance for 10 minutes with B LEs and UEs to increase blood flow   L1 resistance for 10 minutes with B LEs and UEs to increase blood flow   Ankle plantarflexor stretch on incline board     30 second hold x 3 reps with knees extended then flexed;  B UE support   30 second hold x 3 reps with knees extended then flexed;  B UE support   Sidelying hip clamshells 3 x 10 B 2 x 10 B Hooklying   2 x 10 R with manual resistance  10 X 2  3 x 10 ea   Mild discomfort on right side during last set  2 x 10   Sidelying open book   2 x 10 B   2 x 10 ea     Child's pose to modified cobra  X 6 with hold        Quadruped cat/camel X 10    10 X      Sitting hamstring stretch  Supine with strap   3 x 30 sec hold B  Manual in supine   30 second hold x 3 reps each LE; cues to avoid lumbar flexion for improved stretch    Pelvic tilt   15 x 5 sec hold  10 x 5 sec hold       Bridging in hooklying  With ball under legs   2 x 10  X 15   2 x 10     SI belt stabilization trial Presents with new belt         Seated pelvic shifts with stability pads  Clock   -12/6  -3/9  -diagonals  -Circles - CW/CCW    All x 15 Clock   -12/6  -3/9  Circles - CW/CCW  X 10 each   On SB 10 X 2 sets   5x ea   Fwd/back  Side to side   CW/CC   Green pad    Dead bug  Modified   Start in hook lying - then leg out as opposite arm goes back  1 x 10   1 x 5         Pt Ed on pelvic stability, COG, and weight shifting         Supine Marches   Seated on stability pad  X 20  2 x 10 ea       Supine Pelvic tilts  2 x 10 2 x 10       Heel slides    2 x 10 ea with pillow case to ease slide         Manual therapy (18 minutes):  +manual stretching: R HS, ITB, piriformis, glute, - NOT TODAY 11-11  +gentle mobilization in L side lying with stabilize pelvis. - pelvic rocking to increase mobility and decrease pain. +STM to R piriformis, glute    Treatment/Session Summary:    Joseline Cheung has now attended 13 total therapy sessions (4 sessions since last assessment). Treatment Assessment:   Added dead bug abd diagonal clock exercises today with no difficulty. She really likes the new support belt she recieved from [facility]. Communication/Consultation:  None today  Equipment provided today:  None  Recommendations/Intent for next treatment session: Next visit will focus on addressing pelvic alignment, core strengthening, LE stretching. Total Treatment Billable Duration:  48 minutes  Time In: 1243 (Patient 5 minutes late)  Time Out: 0369    Lance Choe PTA       Charge Capture  }Post Session Pain  PT Visit Info  [employer] Portal  MD Guidelines  Scanned Media  Benefits  MyChart    Future Appointments   Date Time Provider Rafi Aleksandra   11/14/2022  9:30 AM [facility] MARCOS MIRANDA AMB   11/14/2022 10:15 AM MD faraz Trinh AMB   11/15/2022 10:15 AM Lance Choe PTA [facility]   11/17/2022 11:00 AM Claudine Guardado, SAMUEL [facility]

## 2022-11-14 ENCOUNTER — ROUTINE PRENATAL (OUTPATIENT)
Dept: OBGYN CLINIC | Age: 37
End: 2022-11-14
Payer: COMMERCIAL

## 2022-11-14 VITALS — SYSTOLIC BLOOD PRESSURE: 102 MMHG | WEIGHT: 158.4 LBS | BODY MASS INDEX: 28.97 KG/M2 | DIASTOLIC BLOOD PRESSURE: 60 MMHG

## 2022-11-14 DIAGNOSIS — Z13.1 SCREENING FOR DIABETES MELLITUS: ICD-10-CM

## 2022-11-14 DIAGNOSIS — Z23 ENCOUNTER FOR ADMINISTRATION OF VACCINE: ICD-10-CM

## 2022-11-14 DIAGNOSIS — Z13.89 SCREENING FOR GENITOURINARY CONDITION: ICD-10-CM

## 2022-11-14 DIAGNOSIS — Z67.91 RH NEGATIVE STATE IN ANTEPARTUM PERIOD: ICD-10-CM

## 2022-11-14 DIAGNOSIS — Z67.91 RH NEGATIVE STATUS DURING PREGNANCY IN SECOND TRIMESTER: ICD-10-CM

## 2022-11-14 DIAGNOSIS — Z13.0 SCREENING FOR IRON DEFICIENCY ANEMIA: ICD-10-CM

## 2022-11-14 DIAGNOSIS — Z34.83 PRENATAL CARE, SUBSEQUENT PREGNANCY, THIRD TRIMESTER: Primary | ICD-10-CM

## 2022-11-14 DIAGNOSIS — Z3A.28 28 WEEKS GESTATION OF PREGNANCY: ICD-10-CM

## 2022-11-14 DIAGNOSIS — O26.892 RH NEGATIVE STATUS DURING PREGNANCY IN SECOND TRIMESTER: ICD-10-CM

## 2022-11-14 DIAGNOSIS — O26.899 RH NEGATIVE STATE IN ANTEPARTUM PERIOD: ICD-10-CM

## 2022-11-14 LAB
BLOOD GROUP ANTIBODIES SERPL: NORMAL
GLUCOSE URINE, POC: NEGATIVE
HGB BLD-MCNC: 11.4 G/DL (ref 11.7–15.4)
PROTEIN,URINE, POC: NEGATIVE

## 2022-11-14 PROCEDURE — 90715 TDAP VACCINE 7 YRS/> IM: CPT | Performed by: NURSE PRACTITIONER

## 2022-11-14 PROCEDURE — 99213 OFFICE O/P EST LOW 20 MIN: CPT | Performed by: NURSE PRACTITIONER

## 2022-11-14 PROCEDURE — 90471 IMMUNIZATION ADMIN: CPT | Performed by: NURSE PRACTITIONER

## 2022-11-14 NOTE — PROGRESS NOTES
Doing well. Glucola and RhoGam today. Discussed tdap- will get today. Needs growth at 32-34w due to covid in 2nd trimester. PTL precautions and [address] reviewed.  RTC 2 weeks

## 2022-11-15 ENCOUNTER — HOSPITAL ENCOUNTER (OUTPATIENT)
Dept: PHYSICAL THERAPY | Age: 37
Setting detail: RECURRING SERIES
End: 2022-11-15
Payer: COMMERCIAL

## 2022-11-15 LAB — GLUCOSE 1 HOUR: 110 MG/DL

## 2022-11-15 NOTE — PROGRESS NOTES
Effie Bowden  : 1985  Primary: [facility]  Secondary:  [address] @ [address] [address]  Phone: [phone]  Fax: [phone] Plan Frequency: twice a week for 12 weeks    Plan of Care/Certification Expiration Date: 22      PT Visit Info: Total # of Visits Approved: 24 (-)  Total # of Visits to Date: 14  Progress Note Counter: 4  No Show: 1  Canceled Appointment: 1         OUTPATIENT PHYSICAL THERAPY 11/15/2022     Appt Desk   Episode   MyChart      Ms. Bowden was a same-day cancellation for today's appointment. Patient reports that she is not feeling well. Cancellation Number: [id number], PTA    Future Appointments   Date Time Provider Rafi [place]   11/15/2022 10:15 AM Larnell Duane, PTA [facility]   2022 11:00 AM Claudine Davis, PT [facility]   2022  8:00 AM MD faraz Brand AMB

## 2022-11-17 ENCOUNTER — HOSPITAL ENCOUNTER (OUTPATIENT)
Dept: PHYSICAL THERAPY | Age: 37
Setting detail: RECURRING SERIES
Discharge: HOME | End: 2022-11-20
Payer: COMMERCIAL

## 2022-11-17 PROCEDURE — 97140 MANUAL THERAPY 1/> REGIONS: CPT

## 2022-11-17 PROCEDURE — 97110 THERAPEUTIC EXERCISES: CPT

## 2022-11-17 NOTE — PROGRESS NOTES
Effie AKILA Dennise  : 1985  Primary: [facility]  Secondary:  [address] @ [address] [address]  Phone: [phone]  Fax: [phone] Plan Frequency: twice a week for 12 weeks    Plan of Care/Certification Expiration Date: 22      PT Visit Info: Total # of Visits Approved: 24 (-)    Visit Count:  14  Progress Note Counter: 5  No Show: 1  Canceled Appointment: 1      OUTPATIENT PHYSICAL THERAPY:OP NOTE TYPE: Treatment Note 2022       Episode  }Appt Desk             Treatment Diagnosis:  Low back pain (M54.5)  Pain in right leg (M79.604)   Muscle weakness (generalized) (M62.81)   Medical/Referring Diagnosis:  Prenatal care, subsequent pregnancy, second trimester [Z34.82]  17 weeks gestation of pregnancy [Z3A.17]  Back pain, unspecified back location, unspecified back pain laterality, unspecified chronicity [M54.9]  Referring Physician:  Yobany Finnegan MD MD Orders:  PT Eval and Treat   Date of Onset:  Onset Date: 22 (beginning of August (no date specified))     Allergies:   Patient has no known allergies. Restrictions/Precautions:  No data recordedNo data recorded   Interventions Planned (Treatment may consist of any combination of the following):    Current Treatment Recommendations: Strengthening; ROM; Functional mobility training; Transfer training; Endurance training; Gait training; Stair training; Neuromuscular re-education; Manual Therapy - Soft Tissue Mobilization; Manual Therapy - Joint Manipulation; Pain management; Return to work related activity; Home exercise program; Patient/Caregiver education & training; Positioning; Modalities; Therapeutic activities     Subjective Comments: Pt reports being very emotional today with her mom being in the hospital. States her back and hip have been a constant 5/10.      Initial:}     5/10  Post Session:     2  /10  Medications Last Reviewed: 11/17/2022  Updated Objective Findings:   none today  Treatment   Therapeutic Exercise: ( 15 minutes):  Exercises per grid below to improve mobility, strength, and dynamic movement of lower back and bilateral legs to improve functional mobility . Required minimal visual, verbal, and manual cues to promote proper body alignment, promote proper body posture, promote proper body mechanics, and promote proper body breathing techniques. Progressed resistance, range, repetitions, and complexity of movement as indicated.                                    Most recent tx:   Date:  11/17/22 Date:  11- Date:  11-9-2022 Date:  11-3-2022 Date:  11/1/22 Date:  10-27-22 Date:  10-24-22   Activity/Exercise          Nustep  L2 Level   8 minutes   - patient stiff and wanted to loosen up before stretches   Declined- too irritable   L4 resistance for 10 minutes with B LEs and UEs to increase blood flow   L1 resistance for 10 minutes with B LEs and UEs to increase blood flow   Ankle plantarflexor stretch on incline board      30 second hold x 3 reps with knees extended then flexed;  B UE support   30 second hold x 3 reps with knees extended then flexed;  B UE support   Sidelying hip clamshells 10 X 2 3 x 10 B 2 x 10 B Hooklying   2 x 10 R with manual resistance  10 X 2  3 x 10 ea   Mild discomfort on right side during last set  2 x 10   Sidelying open book    2 x 10 B   2 x 10 ea     Child's pose to modified cobra   X 6 with hold        Quadruped cat/camel  X 10    10 X      Sitting hamstring stretch   Supine with strap   3 x 30 sec hold B  Manual in supine   30 second hold x 3 reps each LE; cues to avoid lumbar flexion for improved stretch    Pelvic tilt  Seated on airex 10 X 2  15 x 5 sec hold  10 x 5 sec hold       Bridging in hooklying   With ball under legs   2 x 10  X 15   2 x 10     SI belt stabilization trial  Presents with new belt         Seated pelvic shifts with stability pads   Clock -12/6  -3/9  -diagonals  -Circles - CW/CCW    All x 15 Clock   -12/6  -3/9  Circles - CW/CCW  X 10 each   On SB 10 X 2 sets   5x ea   Fwd/back  Side to side   CW/CC   Green pad    Dead bug   Modified   Start in hook lying - then leg out as opposite arm goes back  1 x 10   1 x 5         Supine Marches    Seated on stability pad  X 20  2 x 10 ea       Supine Pelvic tilts   2 x 10 2 x 10       Heel slides     2 x 10 ea with pillow case to ease slide       LTR 10 X each side          Standing hip abduction 10 X 2 each         Standing mini squats 10 X 2           Manual therapy (25 minutes):  +manual stretching: R HS, ITB, piriformis, glute,   +gentle mobilization in L side lying with stabilize pelvis. - pelvic rocking to increase mobility and decrease pain. +STM to R piriformis, glute    Treatment/Session Summary:    Jia Espinoza has now attended 14 total therapy sessions (5 sessions since last assessment). Treatment Assessment: Pt tolerated treatment well. Reduced pain noted from 5/10 to 2/10 this session. Pt with most TTP along piriformis. Communication/Consultation:  None today  Equipment provided today:  None  Recommendations/Intent for next treatment session: Next visit will focus on addressing pelvic alignment, core strengthening, LE stretching.     Total Treatment Billable Duration:  40 minutes  Time In: 1821  Time Out: 5243    Kathleen Rivera PT       Charge Capture  }Post Session Pain  PT Visit Info  [employer] Portal  MD Guidelines  Scanned Media  Benefits  MyChart    Future Appointments   Date Time Provider Rafi [place]   12/1/2022  8:00 AM Gina Ware MD [facility] AMB

## 2022-11-21 ENCOUNTER — HOSPITAL ENCOUNTER (OUTPATIENT)
Dept: PHYSICAL THERAPY | Age: 37
Setting detail: RECURRING SERIES
Discharge: HOME | End: 2022-11-24
Payer: COMMERCIAL

## 2022-11-21 PROCEDURE — 97110 THERAPEUTIC EXERCISES: CPT

## 2022-11-21 ASSESSMENT — PAIN SCALES - GENERAL: PAINLEVEL_OUTOF10: 2

## 2022-11-21 NOTE — PROGRESS NOTES
Chiokendrickrusty WILBURN Dennise  : 1985  Primary: [facility]  Secondary:  [address] @ [address] [address]  Phone: [phone]  Fax: [phone] Plan Frequency: twice a week for 12 weeks    Plan of Care/Certification Expiration Date: 22      PT Visit Info: Total # of Visits Approved: 24 (-)    Visit Count:  15  Progress Note Counter: 6  No Show: 1  Canceled Appointment: 1      OUTPATIENT PHYSICAL THERAPY:OP NOTE TYPE: Treatment Note 2022       Episode  }Appt Desk             Treatment Diagnosis:  Low back pain (M54.5)  Pain in right leg (M79.604)   Muscle weakness (generalized) (M62.81)   Medical/Referring Diagnosis:  Prenatal care, subsequent pregnancy, second trimester [Z34.82]  17 weeks gestation of pregnancy [Z3A.17]  Back pain, unspecified back location, unspecified back pain laterality, unspecified chronicity [M54.9]  Referring Physician:  Elis Hutchins MD MD Orders:  PT Eval and Treat   Date of Onset:  Onset Date: 22 (beginning of August (no date specified))     Allergies:   Patient has no known allergies. Restrictions/Precautions:  No data recordedNo data recorded   Interventions Planned (Treatment may consist of any combination of the following):    Current Treatment Recommendations: Strengthening; ROM; Functional mobility training; Transfer training; Endurance training; Gait training; Stair training; Neuromuscular re-education; Manual Therapy - Soft Tissue Mobilization; Manual Therapy - Joint Manipulation; Pain management; Return to work related activity; Home exercise program; Patient/Caregiver education & training; Positioning; Modalities; Therapeutic activities     Subjective Comments:   Patient reports she is feeling better today. She reprots her mother is home and doing better.   Initial:}    2/10  Post Session:      3/10  Medications Last Reviewed:  2022  Updated Objective Findings:   none today  Treatment   Therapeutic Exercise: ( 47 minutes):  Exercises per grid below to improve mobility, strength, and dynamic movement of lower back and bilateral legs to improve functional mobility . Required minimal visual, verbal, and manual cues to promote proper body alignment, promote proper body posture, promote proper body mechanics, and promote proper body breathing techniques. Progressed resistance, range, repetitions, and complexity of movement as indicated.                                    Most recent tx:   Date:  11-21-22 Date:  11/17/22 Date:  11- Date:  11-9-2022 Date:  11-3-2022 Date:  11/1/22 Date:  10-27-22 Date:  10-24-22   Activity/Exercise           Nustep Level 1  6 minutes   L2 Level   8 minutes   - patient stiff and wanted to loosen up before stretches   Declined- too irritable   L4 resistance for 10 minutes with B LEs and UEs to increase blood flow   L1 resistance for 10 minutes with B LEs and UEs to increase blood flow   Ankle plantarflexor stretch on incline board       30 second hold x 3 reps with knees extended then flexed;  B UE support   30 second hold x 3 reps with knees extended then flexed;  B UE support   Sidelying hip clamshells 3 x 10  10 X 2 3 x 10 B 2 x 10 B Hooklying   2 x 10 R with manual resistance  10 X 2  3 x 10 ea   Mild discomfort on right side during last set  2 x 10   Sidelying open book     2 x 10 B   2 x 10 ea     Child's pose to modified cobra    X 6 with hold        Quadruped cat/camel   X 10    10 X      Sitting hamstring stretch    Supine with strap   3 x 30 sec hold B  Manual in supine   30 second hold x 3 reps each LE; cues to avoid lumbar flexion for improved stretch    Pelvic tilt  With bridge  Seated on airex 10 X 2  15 x 5 sec hold  10 x 5 sec hold       Bridging in hooklying With green ball under legs   3 x 10   With ball under legs   2 x 10  X 15   2 x 10     SI belt stabilization trial   Presents with new belt         Seated pelvic shifts with stability pads    Clock   -12/6  -3/9  -diagonals  -Circles - CW/CCW    All x 15 Clock   -12/6  -3/9  Circles - CW/CCW  X 10 each   On SB 10 X 2 sets   5x ea   Fwd/back  Side to side   CW/CC   Green pad    Dead bug  X 10     Modified   Start in hook lying - then leg out as opposite arm goes back  1 x 10   1 x 5         Supine Marches     Seated on stability pad  X 20  2 x 10 ea       Supine Pelvic tilts    2 x 10 2 x 10       Heel slides      2 x 10 ea with pillow case to ease slide       LTR  10 X each side          Standing hip abduction Side lying   3 x 10 B 10 X 2 each         Standing mini squats  10 X 2           Manual therapy (0 minutes):  +manual stretching: R HS, ITB, piriformis, glute,   +gentle mobilization in L side lying with stabilize pelvis. - pelvic rocking to increase mobility and decrease pain. +STM to R piriformis, glute    Treatment/Session Summary:    Dayton Gonsalves has now attended 15 total therapy sessions (6 sessions since last assessment). Treatment Assessment: Pt tolerated treatment well. Reduced pain noted from 5/10 to 2/10 this session. Pt with most TTP along piriformis. Communication/Consultation:  None today  Equipment provided today:  None  Recommendations/Intent for next treatment session: Next visit will focus on addressing pelvic alignment, core strengthening, LE stretching. Total Treatment Billable Duration:  47 minutes  Time In: 0930  Time Out: [address] [facility]       Charge Capture  }Post Session Pain  PT Visit Info  [employer] Portal  MD Guidelines  Scanned Media  Benefits  MyChart    Future Appointments   Date Time Provider Rafi [place]   11/28/2022 10:15 AM Andrés Campos [facility]   11/30/2022  9:30 AM Claudine Pollock, PT [facility]   12/1/2022  8:00 AM Harsh Black MD [facility]   12/5/2022 10:15 AM Andrés Campos PTA [facility]   12/8/2022 10:15 AM Andrés Campos [facility]   12/13/2022 10:15 AM Claudine CHATTERJEE, PT [facility]

## 2022-11-28 ENCOUNTER — HOSPITAL ENCOUNTER (OUTPATIENT)
Dept: PHYSICAL THERAPY | Age: 37
Setting detail: RECURRING SERIES
Discharge: HOME | End: 2022-12-01
Payer: COMMERCIAL

## 2022-11-28 PROCEDURE — 97110 THERAPEUTIC EXERCISES: CPT

## 2022-11-28 ASSESSMENT — PAIN SCALES - GENERAL: PAINLEVEL_OUTOF10: 2

## 2022-11-28 NOTE — PROGRESS NOTES
Therapeutic Exercise: ( 47 minutes):  Exercises per grid below to improve mobility, strength, and dynamic movement of lower back and bilateral legs to improve functional mobility . Required minimal visual, verbal, and manual cues to promote proper body alignment, promote proper body posture, promote proper body mechanics, and promote proper body breathing techniques. Progressed resistance, range, repetitions, and complexity of movement as indicated.                                    Most recent tx:   Date:  11- Date:  11-21-22 Date:  11/17/22 Date:  11- Date:  11-9-2022 Date:  11-3-2022 Date:  11/1/22   Activity/Exercise          Nustep Level 1  10 minutes  Level 1  6 minutes   L2 Level   8 minutes   - patient stiff and wanted to loosen up before stretches   Declined- too irritable     Ankle plantarflexor stretch on incline board          Sidelying hip clamshells 3 x 10 B 3 x 10  10 X 2 3 x 10 B 2 x 10 B Hooklying   2 x 10 R with manual resistance  10 X 2    Sidelying open book      2 x 10 B     Child's pose to modified cobra     X 6 with hold      Quadruped cat/camel    X 10    10 X    Sitting hamstring stretch     Supine with strap   3 x 30 sec hold B  Manual in supine     Pelvic tilt  With bridge  With bridge  Seated on airex 10 X 2  15 x 5 sec hold  10 x 5 sec hold     Bridging in hooklying 3 x 10  With green ball under legs   3 x 10   With ball under legs   2 x 10  X 15     SI belt stabilization trial    Presents with new belt       Seated pelvic shifts with stability pads     Clock   -12/6  -3/9  -diagonals  -Circles - CW/CCW    All x 15 Clock   -12/6  -3/9  Circles - CW/CCW  X 10 each   On SB 10 X   Dead bug   X 10     Modified   Start in hook lying - then leg out as opposite arm goes back  1 x 10   1 x 5       Supine Marches      Seated on stability pad  X 20  2 x 10 ea     Supine Pelvic tilts     2 x 10 2 x 10     Heel slides       2 x 10 ea with pillow case to ease slide     LTR X 10 each side   10 X each side        Standing hip abduction Side lying   3 x 10 B  Side lying   3 x 10 B 10 X 2 each       Standing mini squats   10 X 2         Manual therapy (0 minutes):  +manual stretching: R HS, ITB, piriformis, glute,   +gentle mobilization in L side lying with stabilize pelvis. - pelvic rocking to increase mobility and decrease pain. +STM to R piriformis, glute    Treatment/Session Summary:    Joseline Cheung has now attended 16 total therapy sessions (7 sessions since last assessment). Treatment Assessment:   Patient feeling better. She did not have SI belt with her today. She stated she feel that the baby is starting to drop slihgtly and she has 2 more months. Communication/Consultation:  None today  Equipment provided today:  None  Recommendations/Intent for next treatment session: Next visit will focus on addressing pelvic alignment, core strengthening, LE stretching. Total Treatment Billable Duration:  47 minutes  Time In: 2066  Time Out: [address] [facility]       Charge Capture  }Post Session Pain  PT Visit Info  [employer] Portal  MD Guidelines  Scanned Media  Benefits  MyChart    Future Appointments   Date Time Provider Rafi [place]   11/30/2022  9:30 AM Claudine Guardado, PT [facility]   12/1/2022  8:00 AM Noy Beltran MD [facility]   12/5/2022 10:15 AM Lance Choe PTA [facility]   12/8/2022 10:15 AM Lance Choe PTA [facility]   12/13/2022 10:15 AM Claudine Guardado, PT [facility]

## 2022-11-30 ENCOUNTER — HOSPITAL ENCOUNTER (OUTPATIENT)
Dept: PHYSICAL THERAPY | Age: 37
Setting detail: RECURRING SERIES
Discharge: HOME | End: 2022-12-03
Payer: COMMERCIAL

## 2022-11-30 PROCEDURE — 97140 MANUAL THERAPY 1/> REGIONS: CPT

## 2022-11-30 NOTE — PROGRESS NOTES
Effie Bowden  : 1985  Primary: [facility]  Secondary:  [address] [address]  Phone: [phone]  Fax: [phone] Plan Frequency: twice a week for 12 weeks    Plan of Care/Certification Expiration Date: 22      PT Visit Info: Total # of Visits Approved: 24 (-)    Visit Count:  17  Progress Note Counter: 1  No Show: 1  Canceled Appointment: 1      OUTPATIENT PHYSICAL THERAPY:OP NOTE TYPE: Treatment Note 2022       Episode  }Appt Desk             Treatment Diagnosis:  Low back pain (M54.5)  Pain in right leg (M79.604)   Muscle weakness (generalized) (M62.81)   Medical/Referring Diagnosis:  Prenatal care, subsequent pregnancy, second trimester [Z34.82]  17 weeks gestation of pregnancy [Z3A.17]  Back pain, unspecified back location, unspecified back pain laterality, unspecified chronicity [M54.9]  Referring Physician:  Feroz Lopez MD MD Orders:  PT Eval and Treat   Date of Onset:  Onset Date: 22 (beginning of August (no date specified))     Allergies:   Patient has no known allergies. Restrictions/Precautions:  No data recordedNo data recorded   Interventions Planned (Treatment may consist of any combination of the following):    Current Treatment Recommendations: Strengthening; ROM; Functional mobility training; Transfer training; Endurance training; Gait training; Stair training; Neuromuscular re-education; Manual Therapy - Soft Tissue Mobilization; Manual Therapy - Joint Manipulation; Pain management; Return to work related activity; Home exercise program; Patient/Caregiver education & training; Positioning; Modalities; Therapeutic activities     Subjective Comments: Pt reports she is having another rough day today.      Initial:}    8 /10  Post Session:     7  /10  Medications Last Reviewed:  2022  Updated Objective Findings:   none today  Treatment   Therapeutic Exercise: ( 0 minutes):  Exercises per grid below to improve mobility, strength, and dynamic movement of lower back and bilateral legs to improve functional mobility . Required minimal visual, verbal, and manual cues to promote proper body alignment, promote proper body posture, promote proper body mechanics, and promote proper body breathing techniques. Progressed resistance, range, repetitions, and complexity of movement as indicated.                                    Most recent tx:   Date:  11/30/22 Date:  11- Date:  11-21-22 Date:  11/17/22 Date:  11- Date:  11-9-2022 Date:  11-3-2022 Date:  11/1/22   Activity/Exercise           Nustep  Level 1  10 minutes  Level 1  6 minutes   L2 Level   8 minutes   - patient stiff and wanted to loosen up before stretches   Declined- too irritable     Ankle plantarflexor stretch on incline board           Sidelying hip clamshells  3 x 10 B 3 x 10  10 X 2 3 x 10 B 2 x 10 B Hooklying   2 x 10 R with manual resistance  10 X 2    Sidelying open book       2 x 10 B     Child's pose to modified cobra      X 6 with hold      Quadruped cat/camel     X 10    10 X    Sitting hamstring stretch      Supine with strap   3 x 30 sec hold B  Manual in supine     Pelvic tilt   With bridge  With bridge  Seated on airex 10 X 2  15 x 5 sec hold  10 x 5 sec hold     Bridging in hooklying  3 x 10  With green ball under legs   3 x 10   With ball under legs   2 x 10  X 15     SI belt stabilization trial     Presents with new belt       Seated pelvic shifts with stability pads      Clock   -12/6  -3/9  -diagonals  -Circles - CW/CCW    All x 15 Clock   -12/6  -3/9  Circles - CW/CCW  X 10 each   On SB 10 X   Dead bug    X 10     Modified   Start in hook lying - then leg out as opposite arm goes back  1 x 10   1 x 5       Supine Marches       Seated on stability pad  X 20  2 x 10 ea     Supine Pelvic tilts      2 x 10 2 x 10     Heel slides        2 x 10 ea with pillow case to ease slide     LTR  X 10 each side   10 X each side        Standing hip abduction  Side lying   3 x 10 B  Side lying   3 x 10 B 10 X 2 each       Standing mini squats    10 X 2         Manual therapy (38 minutes):  +manual stretching: R HS, ITB, piriformis, glute,   +gentle mobilization in L side lying with stabilize pelvis. - pelvic rocking to increase mobility and decrease pain. +STM to R piriformis, glute  +includes measurement time    Treatment/Session Summary:    Effie Bowden has now attended 17 total therapy sessions (1 sessions since last assessment). Treatment Assessment: No progress made with progressing pregnancy. Pt is independent with HEP at this time and appropriate for discharge. Pt in agreement. Total Treatment Billable Duration:  38 minutes  Time In: 0930  Time Out: 1008  Bhaskar Connell PT       Charge Capture  }Post Session Pain  PT Visit Info  [employer] Portal  MD Guidelines  Scanned Media  Benefits  MyChart    Future Appointments   Date Time Provider Rafi [place]   12/1/2022  8:00 AM Zoraida Mata MD [facility]   12/5/2022 10:15 AM Cortez Harding PTA [facility]   12/8/2022 10:15 AM Cortez Harding PTA [facility]   12/13/2022 10:15 AM Claudine CHATTERJEE, PT [facility]

## 2022-11-30 NOTE — THERAPY DISCHARGE
Effie Bowden  : 1985  Primary: [facility]  Secondary:  [address] @ [address] [address]  Phone: [phone]  Fax: [phone] Plan Frequency: twice a week for 12 weeks  Plan of Care/Certification Expiration Date: 22      PT Visit Info: Total # of Visits Approved: 24 (-)  Total # of Visits to Date: 16  Progress Note Counter: 1  No Show: 1  Canceled Appointment: 1      Visit Count:  17    OUTPATIENT PHYSICAL THERAPY:OP NOTE TYPE: Discharge Summary 2022               Episode  Appt Desk         Treatment Diagnosis:  Low back pain (M54.5)  Pain in right leg (M79.604)   Muscle weakness (generalized) (M62.81)   Medical/Referring Diagnosis:  Prenatal care, subsequent pregnancy, second trimester [Z34.82]  17 weeks gestation of pregnancy [Z3A.17]  Back pain, unspecified back location, unspecified back pain laterality, unspecified chronicity [M54.9]  Referring Physician:  Verena Monroe MD MD Orders:  PT Eval and Treat   Return MD Appt:  unspecified  Date of Onset:  Onset Date: 22 (beginning of August (no date specified))     Allergies:  Patient has no known allergies.   Restrictions/Precautions:    No data recordedNo data recorded   Medications Last Reviewed:  2022     SUBJECTIVE   History of Injury/Illness (Reason for Referral):  Location(s) of Injury: R SI joint, with pain going down R buttock; states some numbness wrapping around R anterior thigh; states occasional discomfort in R hamstring  Date of Injury: beginning of 2022  Mechanism of Injury: states no specific injury, just sequelae of pregnancy  Pain at Worst: 10/10 (pain takes about 10-30 minutes to go away)  Aggravating Activities/Functional Limitations: trying to lift things, twisting to either side, walking a lot (15-30 minutes), prolonged standing, sitting in \"slouched position\"  Pain at Best: 0/10 (pt states pain is not constant)  Easing Activities: sitting upright, lying down on either side  Other Pertinent Information: Pt states she has had 2 other pregnancies (both vaginal), no pain during the first pregnancy. States a couple of years after her first pregnancy (age 24) she started developing sciatic pain (about 21years old at this time) - states this does not happen all the time. States during her 2nd pregnancy (age 34) she started getting pain after about 5-6 months of pregnancy. States now she is pregnant again, and about 3-4 months she started getting pain in her R SI joint (currently 5 months pregnancy). States no pain at night in low back. Has body pillow that she uses when needed. States pain is worst in the afternoon (when she has been more active). Denies diastasis recti from previous pregnancies. States it feels like her hips are \"falling apart\". States she also started getting discomfort/pressure in her groin/medial hip region. Patient Stated Goal(s):  \"I just want my leg to not buckle from underneath me (to not fall)\"  Initial:      /10 Post Session:      /10  Past Medical History/Comorbidities:   Ms. Catalina Cardoza  has a past medical history of Abnormal Pap smear, Anemia, Anxiety with depression, Genital herpes, unspecified, Herpes gestationis, and Migraine headache. Ms. Catalina Cardoza  has a past surgical history that includes dilation and evacuation of uterus. 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       OBJECTIVE   Initial Assessment on 9/14/2022  Observation/Orthostatic Postural Assessment:    The following postural deficits were noted in sitting: loss of cervical lordosis, increased thoracic kyphosis, forward head, rounded shoulders, and posterior pelvic tilt   The following postural deficits were noted in standing: loss of lumbar lordosis  Palpation:    Alignment in standing: R pelvis anteriorly rotated, L PSIS noted to be lower  Standing flexion test: R sacral sulci not deepening as much during lumbar flexion and R sidebending  Seated alignment: symmetry noted at iliac crests and PSIS  Long sit test: + (R LE longer in supine but corrected with long sitting)    ROM:    Initial measurement: (on 9/14/2022) Initial measurement: (on 9/14/2022)   WFL throughout L LE, but painful at end-range with following motions: hip flexion WFL throughout R LE, but painful at end range with following motions: hip flexion (more painful mid-range), hip ER        Initial measurement: (on 9/14/2022)   Lumbar AROM  Flexion (% of normal): 100%  Extension (% of normal): 100%  L sidebending (% of normal): 100%  R sidebending (% of normal): 100%     Strength:     Initial measurement: (on 9/14/2022) Initial measurement: (on 9/14/2022)  11/1/22 (L LE) 11/1/22 (R LE) 11/30/22- R LE    L LE: R LE:      Hip flexion 4+/5*  5/5   4+/5 (discomfort)  5/5 (lateral hip pain)   Hip ER 4/5 4+/5   3/5 (pain) 3/5 (painfree, weakness)   Hip IR 4-/5  4/5*   3/5 (discomfort) 4/5 (cramping)   Hip abduction 5/5  4/5   4+/5    Hip adduction 3-/5*  3-/5*       Hip extension NT NT      Knee flexion NT  NT       Knee extension 5/5  5/5       Ankle DF  4+/5  4+/5       *discomfort/pressure in groin  **pain in lateral R hip  Special Tests:          Crys Slider test: + on L for increased tightness with pain at R SI joint; - on R  Trendelenberg: - B  Neurological Screen:        Myotomes:  WFL        Dermatomes:  WFL  Functional Mobility:   Gait/Ambulation: Pt ambulates with no AD with following gait deficits: slower gait speed, altered arm swing, decreased trunk rotation, narrow YANIQUE, decreased B step length, and antalgic gait         Transfers:  pushes up to stand, reaches back to sit; Joseph        Bed Mobility:  [facility], pt uses log roll technique to transition from supine to sit to supine  Balance:         Good in static/dynamic sitting; good in static standing; fair+ in dynamic standing  Sensation:         No deficits noted   ASSESSMENT   Discharge Summary(11/30/22): Pt has been seen for a total of 18 PT sessions to date. Pt currently 30-31 weeks this week. Unfortunately, pt has made minimal to no progress at this time especially as pregnancy progresses. Pt in agreement that discharge from PT is appropriate at this time and would like to return post-partum. Discussed requesting referral at [facility] follow up after birth . Will discharge case at this time. Progress Note (11/1/22): Pt has been seen for total of 10 PT sessions to date. Pt reports the stability of her R hip is feeling better but still having continued pain from glute and into groin region. Will continue PT for another 4 weeks and assess if any continued improvements. Pt will continue to benefit from skilled PT interventions at this time. INITIAL ASSESSMENT:  Jaime Craig is a 40 y.o. female who presents to physical therapy for low back pain associated with pregnancy.  This session, pt demonstrated decreased B LE strength/endurance, pain with B hip mobility (especially flexion and ER at R), pelvic asymmetry with associated functional LE length discrepancy, multiple postural/gait deficits, decreased standing tolerance,  and decreased functional mobility as evident by a score of 15/50 on the Modified Oswestry Low Back Pain Questionnaire (with higher scores indicating increased disability). Pt may benefit from skilled PT to address the above listed deficits to improve ability to perform pain-free ADLs/IADLs and to improve overall quality of life prior to discharge. Problem List: (Impacting functional limitations): Body Structures, Functions, Activity Limitations Requiring Skilled Therapeutic Intervention: Decreased functional mobility ; Decreased ROM; Decreased ADL status; Decreased body mechanics; Decreased tolerance to work activity; Decreased strength; Decreased endurance; Decreased high-level IADLs; Increased pain; Decreased posture     Therapy Prognosis:   Therapy Prognosis: Good     Assessment Complexity:      PLAN   Effective Dates: 9/14/2022 TO Plan of Care/Certification Expiration Date: 12/13/22     Frequency/Duration: Plan Frequency: twice a week for 12 weeks     Interventions Planned (Treatment may consist of any combination of the following):    Current Treatment Recommendations: Strengthening; ROM; Functional mobility training; Transfer training; Endurance training; Gait training; Stair training; Neuromuscular re-education; Manual Therapy - Soft Tissue Mobilization; Manual Therapy - Joint Manipulation; Pain management; Return to work related activity; Home exercise program; Patient/Caregiver education & training; Positioning; Modalities; Therapeutic activities     GOALS: (Goals have been discussed and agreed upon with patient.)  Short-Term Functional Goals: Time Frame: 6 weeks  Pt will be compliant with HEP in order to increase lumbar mobility and LE and core strength/endurance to improve quality of life. Goal met 11/1/22  Pt will reduce score on Modified Oswestry Low Back Pain Questionnaire to 12/50 in order to demonstrate improved functional mobility and quality of life. Not met 11/30/22  Pt will report standing for > 15 minutes with minimal to no increase in pain in order to be able to stand for prolonged periods as needed to perform chores around house. Progressing 11/30/22   Pt will be able to ambulate 500 feet over level surfaces with supervision with minimal to no increase in pain with least restrictive assistive device in order to improve household mobility. Progressing 11/30/22  Discharge Goals: Time Frame: 12 weeks  Pt will be independent with HEP in order to increase lumbar mobility and LE and core strength/endurance to improve quality of life. Goal met 11/30/22  Pt will reduce score on Modified Oswestry Low Back Pain Questionnaire to 9/50 in order to demonstrate improved functional mobility and quality of life. Not met 11/30/22  Pt will report standing for > 25 minutes with minimal to no increase in pain in order to be able to stand for prolonged periods as needed to perform chores around house. Not met 11/30/22   Pt will be able to ambulate 1,000 feet over level and unlevel surfaces with independence with minimal to no increase in pain with least restrictive assistive device in order to improve community mobility Not met 11/30/22       OUTCOME MEASURE:   Modified Oswestry Low Back Pain Questionnaire:  Score:  Initial: 15/50  Most Recent: 21/50 (Date: 11/1/22 ) Most Recent: 27/50 (Date: 11/30/22)   Interpretation of Score: Each section is scored on a 0-5 scale, 5 representing the greatest disability. The scores of each section are added together for a total score of 50. Total Duration:  Time In: 0930  Time Out: 1008    Regarding Effie Bowden's therapy, I certify that the treatment plan above will be carried out by a therapist or under their direction. Thank you for this referral,  Katya Tomlinson, PT     Referring Physician Signature: Krysten Del Cid MD No Signature is Required for this note.         Post Session Pain  Charge Capture  PT Visit Info MD Guidelines  MyChart

## 2022-12-01 ENCOUNTER — ROUTINE PRENATAL (OUTPATIENT)
Dept: OBGYN CLINIC | Age: 37
End: 2022-12-01
Payer: COMMERCIAL

## 2022-12-01 VITALS — BODY MASS INDEX: 29.34 KG/M2 | DIASTOLIC BLOOD PRESSURE: 58 MMHG | WEIGHT: 160.4 LBS | SYSTOLIC BLOOD PRESSURE: 104 MMHG

## 2022-12-01 DIAGNOSIS — Z34.83 PRENATAL CARE, SUBSEQUENT PREGNANCY, THIRD TRIMESTER: Primary | ICD-10-CM

## 2022-12-01 DIAGNOSIS — Z13.89 SCREENING FOR GENITOURINARY CONDITION: ICD-10-CM

## 2022-12-01 DIAGNOSIS — Z3A.30 30 WEEKS GESTATION OF PREGNANCY: ICD-10-CM

## 2022-12-01 LAB
GLUCOSE URINE, POC: NEGATIVE
PROTEIN,URINE, POC: NEGATIVE

## 2022-12-01 PROCEDURE — 99213 OFFICE O/P EST LOW 20 MIN: CPT | Performed by: OBSTETRICS & GYNECOLOGY

## 2022-12-01 RX ORDER — VALACYCLOVIR HYDROCHLORIDE 500 MG/1
500 TABLET, FILM COATED ORAL DAILY
Qty: 30 TABLET | Refills: 3 | Status: SHIPPED | OUTPATIENT
Start: 2022-12-01

## 2022-12-01 NOTE — PROGRESS NOTES
Continue home Eliquis 5mg BID   Patient Active Problem List    Diagnosis Date Noted    H/O migraine during pregnancy 08/24/2022    COVID-19 affecting pregnancy in second trimester 08/15/2022    Rh negative status during pregnancy 07/12/2022    High risk pregnancy, multigravida of advanced maternal age in second trimester 07/07/2022    Genital herpes affecting pregnancy in second trimester 07/07/2022    Family history of congenital heart defect 07/07/2022    History of anxiety 05/24/2016   typical c/o pregnancy with back pain  Glucola normal  Will give valtrex today for prophylaxis to start at 35 sooner with outbreak ( 2gms at onset)  GFM  Has growth at next visit for covid

## 2022-12-16 ENCOUNTER — ROUTINE PRENATAL (OUTPATIENT)
Dept: OBGYN CLINIC | Age: 37
End: 2022-12-16

## 2022-12-16 VITALS — DIASTOLIC BLOOD PRESSURE: 64 MMHG | BODY MASS INDEX: 30.22 KG/M2 | SYSTOLIC BLOOD PRESSURE: 108 MMHG | WEIGHT: 165.2 LBS

## 2022-12-16 DIAGNOSIS — O98.513 COVID-19 AFFECTING PREGNANCY IN THIRD TRIMESTER: Primary | ICD-10-CM

## 2022-12-16 DIAGNOSIS — U07.1 COVID-19 AFFECTING PREGNANCY IN THIRD TRIMESTER: Primary | ICD-10-CM

## 2022-12-16 DIAGNOSIS — O09.522 HIGH RISK PREGNANCY, MULTIGRAVIDA OF ADVANCED MATERNAL AGE IN SECOND TRIMESTER: ICD-10-CM

## 2022-12-16 DIAGNOSIS — Z13.89 SCREENING FOR GENITOURINARY CONDITION: ICD-10-CM

## 2022-12-16 DIAGNOSIS — Z3A.32 32 WEEKS GESTATION OF PREGNANCY: ICD-10-CM

## 2022-12-16 DIAGNOSIS — Z34.83 PRENATAL CARE, SUBSEQUENT PREGNANCY, THIRD TRIMESTER: ICD-10-CM

## 2022-12-16 DIAGNOSIS — O09.523 MULTIGRAVIDA OF ADVANCED MATERNAL AGE IN THIRD TRIMESTER: ICD-10-CM

## 2022-12-16 LAB
GLUCOSE URINE, POC: NEGATIVE
PROTEIN,URINE, POC: NEGATIVE

## 2022-12-16 NOTE — PROGRESS NOTES
EFW [Patient Intake Form Reviewed] : Patient intake form was reviewed [As Noted in HPI] : as noted in HPI [Negative] : Heme/Lymph

## 2022-12-16 NOTE — PROGRESS NOTES
Doing well, good fetal movement  C/o hemorrhoids and constipation  Rec miralax, colace, inc water. Prep H prn.     EFW performed scondary to AMA, h/o covid during pregnancy    Overall EFW = 59%  AC = 78%  NINOSKA = 17 cm    Posterior placenta, Grade 1  Vertex position    Rtc 2wk

## 2022-12-30 ENCOUNTER — ROUTINE PRENATAL (OUTPATIENT)
Dept: OBGYN CLINIC | Age: 37
End: 2022-12-30
Payer: COMMERCIAL

## 2022-12-30 VITALS — WEIGHT: 169 LBS | DIASTOLIC BLOOD PRESSURE: 60 MMHG | SYSTOLIC BLOOD PRESSURE: 106 MMHG | BODY MASS INDEX: 30.91 KG/M2

## 2022-12-30 DIAGNOSIS — O98.313 GENITAL HERPES AFFECTING PREGNANCY IN THIRD TRIMESTER: ICD-10-CM

## 2022-12-30 DIAGNOSIS — Z13.89 SCREENING FOR GENITOURINARY CONDITION: ICD-10-CM

## 2022-12-30 DIAGNOSIS — A60.09 GENITAL HERPES AFFECTING PREGNANCY IN THIRD TRIMESTER: ICD-10-CM

## 2022-12-30 DIAGNOSIS — O09.523 HIGH RISK PREGNANCY, MULTIGRAVIDA OF ADVANCED MATERNAL AGE IN THIRD TRIMESTER: Primary | ICD-10-CM

## 2022-12-30 DIAGNOSIS — Z3A.34 34 WEEKS GESTATION OF PREGNANCY: ICD-10-CM

## 2022-12-30 LAB
GLUCOSE URINE, POC: NEGATIVE
PROTEIN,URINE, POC: NEGATIVE

## 2022-12-30 PROCEDURE — 99213 OFFICE O/P EST LOW 20 MIN: CPT | Performed by: OBSTETRICS & GYNECOLOGY

## 2023-01-06 ENCOUNTER — ROUTINE PRENATAL (OUTPATIENT)
Dept: OBGYN CLINIC | Age: 38
End: 2023-01-06
Payer: COMMERCIAL

## 2023-01-06 VITALS — WEIGHT: 169.4 LBS | DIASTOLIC BLOOD PRESSURE: 60 MMHG | SYSTOLIC BLOOD PRESSURE: 102 MMHG | BODY MASS INDEX: 30.98 KG/M2

## 2023-01-06 DIAGNOSIS — Z3A.35 35 WEEKS GESTATION OF PREGNANCY: ICD-10-CM

## 2023-01-06 DIAGNOSIS — O09.523 HIGH RISK PREGNANCY, MULTIGRAVIDA OF ADVANCED MATERNAL AGE IN THIRD TRIMESTER: Primary | ICD-10-CM

## 2023-01-06 DIAGNOSIS — Z36.85 ANTENATAL SCREENING FOR STREPTOCOCCUS B: ICD-10-CM

## 2023-01-06 DIAGNOSIS — Z13.89 SCREENING FOR GENITOURINARY CONDITION: ICD-10-CM

## 2023-01-06 LAB
GLUCOSE URINE, POC: NEGATIVE
PROTEIN,URINE, POC: NEGATIVE

## 2023-01-06 PROCEDURE — 99213 OFFICE O/P EST LOW 20 MIN: CPT | Performed by: OBSTETRICS & GYNECOLOGY

## 2023-01-08 LAB
BACTERIA SPEC CULT: NORMAL
BILE AC SERPL-SCNC: 13.9 UMOL/L (ref 0–10)
SERVICE CMNT-IMP: NORMAL

## 2023-01-09 ENCOUNTER — PATIENT MESSAGE (OUTPATIENT)
Dept: OBGYN CLINIC | Age: 38
End: 2023-01-09

## 2023-01-09 DIAGNOSIS — O26.613 CHOLESTASIS DURING PREGNANCY IN THIRD TRIMESTER: Primary | ICD-10-CM

## 2023-01-09 DIAGNOSIS — K83.1 CHOLESTASIS DURING PREGNANCY IN THIRD TRIMESTER: Primary | ICD-10-CM

## 2023-01-09 RX ORDER — URSODIOL 300 MG/1
300 CAPSULE ORAL
Qty: 30 CAPSULE | Refills: 0 | Status: SHIPPED | OUTPATIENT
Start: 2023-01-09 | End: 2023-01-19

## 2023-01-13 ENCOUNTER — ROUTINE PRENATAL (OUTPATIENT)
Dept: OBGYN CLINIC | Age: 38
End: 2023-01-13

## 2023-01-13 VITALS — SYSTOLIC BLOOD PRESSURE: 128 MMHG | DIASTOLIC BLOOD PRESSURE: 82 MMHG | WEIGHT: 167.6 LBS | BODY MASS INDEX: 30.65 KG/M2

## 2023-01-13 DIAGNOSIS — K83.1 CHOLESTASIS OF PREGNANCY, ANTEPARTUM, THIRD TRIMESTER: Primary | ICD-10-CM

## 2023-01-13 DIAGNOSIS — O09.523 HIGH RISK PREGNANCY, MULTIGRAVIDA OF ADVANCED MATERNAL AGE IN THIRD TRIMESTER: ICD-10-CM

## 2023-01-13 DIAGNOSIS — Z13.89 SCREENING FOR GENITOURINARY CONDITION: ICD-10-CM

## 2023-01-13 DIAGNOSIS — O26.613 CHOLESTASIS OF PREGNANCY, ANTEPARTUM, THIRD TRIMESTER: Primary | ICD-10-CM

## 2023-01-13 DIAGNOSIS — Z3A.36 36 WEEKS GESTATION OF PREGNANCY: ICD-10-CM

## 2023-01-13 LAB
GLUCOSE URINE, POC: NEGATIVE
PROTEIN,URINE, POC: NEGATIVE

## 2023-01-13 RX ORDER — URSODIOL 300 MG/1
CAPSULE ORAL
Qty: 90 CAPSULE | Refills: 0 | Status: SHIPPED | OUTPATIENT
Start: 2023-01-13

## 2023-01-13 NOTE — PROGRESS NOTES
Labs from 01/06/23 suggestive of cholestasis. Patient states the medicine that was prescribed and benadryl  is not helping with the itching.

## 2023-01-13 NOTE — PROGRESS NOTES
With cholestasis need twice weekly testing and delivery 38th week. She is still very symptomatic from the itching. Discussed with Dr. Hero Kay and he recommend increasing her to 600 in the morning and afternoon and 300 in the evening. She will follow-up with her twice-weekly testing next week. Strict fetal movement protocol information was given to her.

## 2023-01-16 ENCOUNTER — ROUTINE PRENATAL (OUTPATIENT)
Dept: OBGYN CLINIC | Age: 38
End: 2023-01-16
Payer: COMMERCIAL

## 2023-01-16 VITALS — BODY MASS INDEX: 30.4 KG/M2 | WEIGHT: 166.2 LBS

## 2023-01-16 DIAGNOSIS — O09.522 HIGH RISK PREGNANCY, MULTIGRAVIDA OF ADVANCED MATERNAL AGE IN SECOND TRIMESTER: Primary | ICD-10-CM

## 2023-01-16 DIAGNOSIS — Z3A.37 37 WEEKS GESTATION OF PREGNANCY: ICD-10-CM

## 2023-01-16 DIAGNOSIS — Z86.69 H/O MIGRAINE DURING PREGNANCY: ICD-10-CM

## 2023-01-16 DIAGNOSIS — O98.312 GENITAL HERPES AFFECTING PREGNANCY IN SECOND TRIMESTER: ICD-10-CM

## 2023-01-16 DIAGNOSIS — O26.893 RH NEGATIVE STATUS DURING PREGNANCY IN THIRD TRIMESTER: ICD-10-CM

## 2023-01-16 DIAGNOSIS — K83.1 CHOLESTASIS DURING PREGNANCY IN THIRD TRIMESTER: ICD-10-CM

## 2023-01-16 DIAGNOSIS — A60.09 GENITAL HERPES AFFECTING PREGNANCY IN SECOND TRIMESTER: ICD-10-CM

## 2023-01-16 DIAGNOSIS — U07.1 COVID-19 AFFECTING PREGNANCY IN SECOND TRIMESTER: ICD-10-CM

## 2023-01-16 DIAGNOSIS — Z13.89 SCREENING FOR GENITOURINARY CONDITION: ICD-10-CM

## 2023-01-16 DIAGNOSIS — Z87.59 H/O MIGRAINE DURING PREGNANCY: ICD-10-CM

## 2023-01-16 DIAGNOSIS — O26.613 CHOLESTASIS DURING PREGNANCY IN THIRD TRIMESTER: ICD-10-CM

## 2023-01-16 DIAGNOSIS — Z86.59 HISTORY OF ANXIETY: ICD-10-CM

## 2023-01-16 DIAGNOSIS — O98.512 COVID-19 AFFECTING PREGNANCY IN SECOND TRIMESTER: ICD-10-CM

## 2023-01-16 DIAGNOSIS — Z67.91 RH NEGATIVE STATUS DURING PREGNANCY IN THIRD TRIMESTER: ICD-10-CM

## 2023-01-16 DIAGNOSIS — Z34.83 PRENATAL CARE, SUBSEQUENT PREGNANCY, THIRD TRIMESTER: ICD-10-CM

## 2023-01-16 LAB
GLUCOSE URINE, POC: NEGATIVE
PROTEIN,URINE, POC: NEGATIVE

## 2023-01-16 PROCEDURE — 81002 URINALYSIS NONAUTO W/O SCOPE: CPT | Performed by: STUDENT IN AN ORGANIZED HEALTH CARE EDUCATION/TRAINING PROGRAM

## 2023-01-16 PROCEDURE — 76816 OB US FOLLOW-UP PER FETUS: CPT | Performed by: STUDENT IN AN ORGANIZED HEALTH CARE EDUCATION/TRAINING PROGRAM

## 2023-01-16 PROCEDURE — 76818 FETAL BIOPHYS PROFILE W/NST: CPT | Performed by: STUDENT IN AN ORGANIZED HEALTH CARE EDUCATION/TRAINING PROGRAM

## 2023-01-16 PROCEDURE — 99213 OFFICE O/P EST LOW 20 MIN: CPT | Performed by: STUDENT IN AN ORGANIZED HEALTH CARE EDUCATION/TRAINING PROGRAM

## 2023-01-16 NOTE — PROGRESS NOTES
40 y.o. [id number] at 37w2d  who is here for routine OB visit. EFW & BPP performed secondary to AMA, cholestasis and covid    +FHT= 149 BPM  Overall EFW= 49%  AC= 49%  HC=11% ( percentile may be off slightly due to position of head)  NINOSKA= 18 cm     Vertex   Posterior Grade 3   BPP= 6/8 (no breathing)   NST reactive; baseline 130s, good accels, no decels, mod asia    Will schedule IOL for early next week  SVE 2-3/50/-3, will not need cervical ripening. HISTORY:  OB History    Para Term  AB Living   4 2 2   1 2   SAB IAB Ectopic Molar Multiple Live Births   1         2      # Outcome Date GA Lbr John/2nd Weight Sex Delivery Anes PTL Lv   4 Current            3 Term 02/27/15 39w4d  8 lb 11 oz (3.94 kg) M Vag-Spont EPI N ILA   2 Term 07 40w0d  7 lb 6 oz (3.345 kg) F Vag-Spont EPI  ILA   1 SAB  12w0d             Birth Comments: D&E required      Patient's last menstrual period was 2022. Social History     Substance and Sexual Activity   Sexual Activity Yes    Partners: Male    Birth control/protection: I.U.D. Past Medical History:   Diagnosis Date    Abnormal Pap smear     Anemia     Anxiety with depression     Cholestasis during pregnancy in third trimester 2023    Genital herpes, unspecified     pt denies current outbreak    Herpes gestationis     pt states she had an outbreak prior to starting Valtrex    Migraine headache 2022      Past Surgical History:   Procedure Laterality Date    DILATION AND EVACUATION OF UTERUS      MAB       ROS:  Feeling well. No dyspnea or chest pain on exertion. No abdominal pain, change in bowel habits, black or bloody stools. No urinary tract symptoms. OB ROS: No vaginal bleeding, cxns, LOF, decreased FM. No HA, vision changes, abdominal pain. PHYSICAL EXAM:  Wt 166 lb 3.2 oz (75.4 kg)   LMP 2022   BMI 30.40 kg/m²        ASSESSMENT / PLAN:  1.  High risk pregnancy, multigravida of advanced maternal age in second trimester  Overview:  Undecided on genetic testing. GTT-110  COVID-not vaccinated  Flu-  Tdap-  11/11/22 at [facility]: Strong FHx of congenital heart defects. Appropriate fetal growth; Normal anatomy and echo; AC 46%, Overall 53%, NINOSKA 11 cm, Dopplers WNL, CL 3 cm    No F/U MFM-refer back as needed    Orders:  -     AMB POC US OB RE-EVAL/FOLLOW UP  -     AMB POC US FETAL BIOPHYSICAL PROFILE W/ NON-STRESS TESTING  2. 37 weeks gestation of pregnancy  -     AMB POC US OB RE-EVAL/FOLLOW UP  -     AMB POC US FETAL BIOPHYSICAL PROFILE W/ NON-STRESS TESTING  3. Prenatal care, subsequent pregnancy, third trimester  4. COVID-19 affecting pregnancy in second trimester  Overview:  Growth 32-34 weeks. Orders:  -     AMB POC US OB RE-EVAL/FOLLOW UP  -     AMB POC US FETAL BIOPHYSICAL PROFILE W/ NON-STRESS TESTING  5. Cholestasis during pregnancy in third trimester  Overview: With cholestasis need twice weekly testing and delivery 38th week. She is still very symptomatic from the itching. Discussed with Dr. Jana Brown and he recommend increasing her ursadiol to 600 in the morning and afternoon and 300 in the evening. Orders:  -     AMB POC US OB RE-EVAL/FOLLOW UP  -     AMB POC US FETAL BIOPHYSICAL PROFILE W/ NON-STRESS TESTING  6. Genital herpes affecting pregnancy in second trimester  Overview:  Valtrex at 36 weeks. 11/11/22 at [facility]:   Valtrex suppression with 500 mgs po bid starting at 36 weeks    7. History of anxiety  Overview:  Not on medication  8. Rh negative status during pregnancy in third trimester  Overview:  ABS- 11/14/2022    Rhogam- 11/14/2022  9. H/O migraine during pregnancy  Overview:  Patient has tried OTC meds. Recommended Excedrin tension and Mag 300 mg bid.     10. Screening for genitourinary condition  -     AMB POC OB URINE DIP         Bo Aldana MD

## 2023-01-17 ENCOUNTER — TELEPHONE (OUTPATIENT)
Dept: OBGYN CLINIC | Age: 38
End: 2023-01-17

## 2023-01-17 ENCOUNTER — HOSPITAL ENCOUNTER (OUTPATIENT)
Age: 38
Discharge: HOME | End: 2023-01-17
Attending: OBSTETRICS & GYNECOLOGY | Admitting: OBSTETRICS & GYNECOLOGY
Payer: COMMERCIAL

## 2023-01-17 VITALS
TEMPERATURE: 98 F | HEIGHT: 62 IN | DIASTOLIC BLOOD PRESSURE: 79 MMHG | SYSTOLIC BLOOD PRESSURE: 116 MMHG | HEART RATE: 86 BPM | WEIGHT: 166 LBS | OXYGEN SATURATION: 98 % | BODY MASS INDEX: 30.55 KG/M2 | RESPIRATION RATE: 15 BRPM

## 2023-01-17 PROCEDURE — 6370000000 HC RX 637 (ALT 250 FOR IP): Performed by: OBSTETRICS & GYNECOLOGY

## 2023-01-17 PROCEDURE — 99285 EMERGENCY DEPT VISIT HI MDM: CPT

## 2023-01-17 RX ORDER — DIPHENHYDRAMINE HCL 50 MG
50 CAPSULE ORAL EVERY 6 HOURS PRN
COMMUNITY

## 2023-01-17 RX ORDER — CYCLOBENZAPRINE HCL 10 MG
10 TABLET ORAL ONCE
Status: COMPLETED | OUTPATIENT
Start: 2023-01-17 | End: 2023-01-17

## 2023-01-17 RX ADMIN — CYCLOBENZAPRINE 10 MG: 10 TABLET, FILM COATED ORAL at 20:44

## 2023-01-17 NOTE — PROGRESS NOTES
Pt presents to [facility] with c/o LOF since 1430 and contractions since early am. Denies VB, states +FM. EFM/toco applied. VSS.  Dr. Royal Hall notified of pt arrival.

## 2023-01-17 NOTE — TELEPHONE ENCOUNTER
Pt called stating she was having CTX like pain, LOF and increased vaginal pressure since this morning. Denies VB. Reports good FM. Reports the pain is becoming more consistent. Rec being seen at [facility] triage to be evaluated. Pt v/u.

## 2023-01-18 NOTE — PROGRESS NOTES
D/c instructions given c pt verb understanding. All questions answered. Pt left ambulatory c significant other.

## 2023-01-19 ENCOUNTER — ROUTINE PRENATAL (OUTPATIENT)
Dept: OBGYN CLINIC | Age: 38
End: 2023-01-19

## 2023-01-19 VITALS — BODY MASS INDEX: 30.29 KG/M2 | WEIGHT: 165.6 LBS | DIASTOLIC BLOOD PRESSURE: 82 MMHG | SYSTOLIC BLOOD PRESSURE: 122 MMHG

## 2023-01-19 DIAGNOSIS — O09.523 HIGH RISK PREGNANCY, MULTIGRAVIDA OF ADVANCED MATERNAL AGE IN THIRD TRIMESTER: ICD-10-CM

## 2023-01-19 DIAGNOSIS — Z3A.37 37 WEEKS GESTATION OF PREGNANCY: ICD-10-CM

## 2023-01-19 DIAGNOSIS — Z13.89 SCREENING FOR GENITOURINARY CONDITION: ICD-10-CM

## 2023-01-19 DIAGNOSIS — K83.1 CHOLESTASIS DURING PREGNANCY IN THIRD TRIMESTER: Primary | ICD-10-CM

## 2023-01-19 DIAGNOSIS — O26.613 CHOLESTASIS DURING PREGNANCY IN THIRD TRIMESTER: Primary | ICD-10-CM

## 2023-01-19 LAB
GLUCOSE URINE, POC: NEGATIVE
PROTEIN,URINE, POC: NEGATIVE

## 2023-01-19 NOTE — PROGRESS NOTES
BPP 6/8  NST very reactive with multiple accelerations no decelerations moderate variability and greater than 30 minutes on the monitor  Patient is already scheduled for induction at 38 weeks she is given labor precautions and we will continue with that plan

## 2023-01-23 ENCOUNTER — HOSPITAL ENCOUNTER (INPATIENT)
Age: 38
LOS: 2 days | Discharge: HOME | DRG: 560 | End: 2023-01-25
Attending: OBSTETRICS & GYNECOLOGY | Admitting: OBSTETRICS & GYNECOLOGY
Payer: COMMERCIAL

## 2023-01-23 ENCOUNTER — ANESTHESIA (OUTPATIENT)
Dept: LABOR AND DELIVERY | Age: 38
DRG: 560 | End: 2023-01-23
Payer: COMMERCIAL

## 2023-01-23 ENCOUNTER — ANESTHESIA EVENT (OUTPATIENT)
Dept: LABOR AND DELIVERY | Age: 38
DRG: 560 | End: 2023-01-23
Payer: COMMERCIAL

## 2023-01-23 PROBLEM — Z37.9 NORMAL LABOR: Status: ACTIVE | Noted: 2023-01-23

## 2023-01-23 LAB
ABO + RH BLD: NORMAL
BASE DEFICIT BLD-SCNC: 1 MMOL/L
BASE DEFICIT BLD-SCNC: 1.6 MMOL/L
BASOPHILS # BLD: 0.1 K/UL (ref 0–0.2)
BASOPHILS NFR BLD: 1 % (ref 0–2)
BLOOD BANK CMNT PATIENT-IMP: NORMAL
BLOOD GROUP ANTIBODIES SERPL: NORMAL
DIFFERENTIAL METHOD BLD: ABNORMAL
EOSINOPHIL # BLD: 0.2 K/UL (ref 0–0.8)
EOSINOPHIL NFR BLD: 1 % (ref 0.5–7.8)
ERYTHROCYTE [DISTWIDTH] IN BLOOD BY AUTOMATED COUNT: 13.7 % (ref 11.9–14.6)
FETAL SCREEN: NORMAL
HCO3 BLD-SCNC: 23.6 MMOL/L (ref 22–26)
HCO3 BLDV-SCNC: 22.9 MMOL/L (ref 23–28)
HCT VFR BLD AUTO: 39.3 % (ref 35.8–46.3)
HGB BLD-MCNC: 13.5 G/DL (ref 11.7–15.4)
IMM GRANULOCYTES # BLD AUTO: 0.1 K/UL (ref 0–0.5)
IMM GRANULOCYTES NFR BLD AUTO: 1 % (ref 0–5)
LYMPHOCYTES # BLD: 2.9 K/UL (ref 0.5–4.6)
LYMPHOCYTES NFR BLD: 25 % (ref 13–44)
MCH RBC QN AUTO: 29.7 PG (ref 26.1–32.9)
MCHC RBC AUTO-ENTMCNC: 34.4 G/DL (ref 31.4–35)
MCV RBC AUTO: 86.4 FL (ref 82–102)
MONOCYTES # BLD: 0.8 K/UL (ref 0.1–1.3)
MONOCYTES NFR BLD: 7 % (ref 4–12)
NEUTS SEG # BLD: 7.8 K/UL (ref 1.7–8.2)
NEUTS SEG NFR BLD: 66 % (ref 43–78)
NRBC # BLD: 0.02 K/UL (ref 0–0.2)
PCO2 BLDCO: 37 MMHG (ref 32–68)
PCO2 BLDCO: 39 MMHG (ref 32–68)
PH BLDCO: 7.39 (ref 7.15–7.38)
PH BLDCO: 7.4 (ref 7.15–7.38)
PLATELET # BLD AUTO: 198 K/UL (ref 150–450)
PMV BLD AUTO: 11.7 FL (ref 9.4–12.3)
PO2 BLDCO: 23 MMHG
PO2 BLDCO: 25 MMHG
RBC # BLD AUTO: 4.55 M/UL (ref 4.05–5.2)
SAO2 % BLD: 39.5 % (ref 95–98)
SAO2 % BLDV: 44.4 % (ref 65–88)
SERVICE CMNT-IMP: ABNORMAL
SERVICE CMNT-IMP: ABNORMAL
SPECIMEN EXP DATE BLD: NORMAL
SPECIMEN TYPE: ABNORMAL
SPECIMEN TYPE: ABNORMAL
WBC # BLD AUTO: 11.9 K/UL (ref 4.3–11.1)

## 2023-01-23 PROCEDURE — 7210000100 HC LABOR FEE PER 1 HR

## 2023-01-23 PROCEDURE — 7100000011 HC PHASE II RECOVERY - ADDTL 15 MIN

## 2023-01-23 PROCEDURE — 10907ZC DRAINAGE OF AMNIOTIC FLUID, THERAPEUTIC FROM PRODUCTS OF CONCEPTION, VIA NATURAL OR ARTIFICIAL OPENING: ICD-10-PCS | Performed by: OBSTETRICS & GYNECOLOGY

## 2023-01-23 PROCEDURE — 85461 HEMOGLOBIN FETAL: CPT

## 2023-01-23 PROCEDURE — 82803 BLOOD GASES ANY COMBINATION: CPT

## 2023-01-23 PROCEDURE — 7100000010 HC PHASE II RECOVERY - FIRST 15 MIN

## 2023-01-23 PROCEDURE — 6360000002 HC RX W HCPCS: Performed by: NURSE ANESTHETIST, CERTIFIED REGISTERED

## 2023-01-23 PROCEDURE — 62325 NJX INTERLAMINAR CRV/THRC: CPT | Performed by: ANESTHESIOLOGY

## 2023-01-23 PROCEDURE — 2580000003 HC RX 258: Performed by: OBSTETRICS & GYNECOLOGY

## 2023-01-23 PROCEDURE — 00HU33Z INSERTION OF INFUSION DEVICE INTO SPINAL CANAL, PERCUTANEOUS APPROACH: ICD-10-PCS | Performed by: ANESTHESIOLOGY

## 2023-01-23 PROCEDURE — 7220000101 HC DELIVERY VAGINAL/SINGLE

## 2023-01-23 PROCEDURE — 59409 OBSTETRICAL CARE: CPT | Performed by: OBSTETRICS & GYNECOLOGY

## 2023-01-23 PROCEDURE — 51701 INSERT BLADDER CATHETER: CPT

## 2023-01-23 PROCEDURE — 36600 WITHDRAWAL OF ARTERIAL BLOOD: CPT

## 2023-01-23 PROCEDURE — 86850 RBC ANTIBODY SCREEN: CPT

## 2023-01-23 PROCEDURE — 85025 COMPLETE CBC W/AUTO DIFF WBC: CPT

## 2023-01-23 PROCEDURE — 1100000000 HC RM PRIVATE

## 2023-01-23 PROCEDURE — 36415 COLL VENOUS BLD VENIPUNCTURE: CPT

## 2023-01-23 PROCEDURE — 6360000002 HC RX W HCPCS: Performed by: OBSTETRICS & GYNECOLOGY

## 2023-01-23 PROCEDURE — 6370000000 HC RX 637 (ALT 250 FOR IP): Performed by: OBSTETRICS & GYNECOLOGY

## 2023-01-23 RX ORDER — ONDANSETRON 2 MG/ML
4 INJECTION INTRAMUSCULAR; INTRAVENOUS EVERY 6 HOURS PRN
Status: DISCONTINUED | OUTPATIENT
Start: 2023-01-23 | End: 2023-01-23

## 2023-01-23 RX ORDER — SODIUM CHLORIDE 0.9 % (FLUSH) 0.9 %
5-40 SYRINGE (ML) INJECTION EVERY 12 HOURS SCHEDULED
Status: DISCONTINUED | OUTPATIENT
Start: 2023-01-23 | End: 2023-01-23

## 2023-01-23 RX ORDER — ACYCLOVIR 200 MG/1
400 CAPSULE ORAL 3 TIMES DAILY
Status: DISCONTINUED | OUTPATIENT
Start: 2023-01-23 | End: 2023-01-25 | Stop reason: HOSPADM

## 2023-01-23 RX ORDER — SODIUM CHLORIDE, SODIUM LACTATE, POTASSIUM CHLORIDE, CALCIUM CHLORIDE 600; 310; 30; 20 MG/100ML; MG/100ML; MG/100ML; MG/100ML
INJECTION, SOLUTION INTRAVENOUS CONTINUOUS
Status: DISCONTINUED | OUTPATIENT
Start: 2023-01-23 | End: 2023-01-23

## 2023-01-23 RX ORDER — SODIUM CHLORIDE, SODIUM LACTATE, POTASSIUM CHLORIDE, AND CALCIUM CHLORIDE .6; .31; .03; .02 G/100ML; G/100ML; G/100ML; G/100ML
1000 INJECTION, SOLUTION INTRAVENOUS PRN
Status: DISCONTINUED | OUTPATIENT
Start: 2023-01-23 | End: 2023-01-23

## 2023-01-23 RX ORDER — METHYLERGONOVINE MALEATE 0.2 MG/ML
200 INJECTION INTRAVENOUS PRN
Status: DISCONTINUED | OUTPATIENT
Start: 2023-01-23 | End: 2023-01-23

## 2023-01-23 RX ORDER — CARBOPROST TROMETHAMINE 250 UG/ML
250 INJECTION, SOLUTION INTRAMUSCULAR PRN
Status: DISCONTINUED | OUTPATIENT
Start: 2023-01-23 | End: 2023-01-23

## 2023-01-23 RX ORDER — ROPIVACAINE HYDROCHLORIDE 2 MG/ML
INJECTION, SOLUTION EPIDURAL; INFILTRATION; PERINEURAL CONTINUOUS PRN
Status: DISCONTINUED | OUTPATIENT
Start: 2023-01-23 | End: 2023-01-23 | Stop reason: SDUPTHER

## 2023-01-23 RX ORDER — TERBUTALINE SULFATE 1 MG/ML
0.25 INJECTION, SOLUTION SUBCUTANEOUS ONCE
Status: DISCONTINUED | OUTPATIENT
Start: 2023-01-23 | End: 2023-01-23

## 2023-01-23 RX ORDER — ROPIVACAINE HYDROCHLORIDE 5 MG/ML
INJECTION, SOLUTION EPIDURAL; INFILTRATION; PERINEURAL PRN
Status: DISCONTINUED | OUTPATIENT
Start: 2023-01-23 | End: 2023-01-23 | Stop reason: SDUPTHER

## 2023-01-23 RX ORDER — DOCUSATE SODIUM 100 MG/1
100 CAPSULE, LIQUID FILLED ORAL 2 TIMES DAILY
Status: DISCONTINUED | OUTPATIENT
Start: 2023-01-23 | End: 2023-01-23

## 2023-01-23 RX ORDER — ONDANSETRON 8 MG/1
8 TABLET, ORALLY DISINTEGRATING ORAL EVERY 8 HOURS PRN
Status: DISCONTINUED | OUTPATIENT
Start: 2023-01-23 | End: 2023-01-25 | Stop reason: HOSPADM

## 2023-01-23 RX ORDER — IBUPROFEN 800 MG/1
800 TABLET ORAL EVERY 8 HOURS
Status: DISCONTINUED | OUTPATIENT
Start: 2023-01-23 | End: 2023-01-25 | Stop reason: HOSPADM

## 2023-01-23 RX ORDER — TRANEXAMIC ACID 10 MG/ML
1000 INJECTION, SOLUTION INTRAVENOUS
Status: DISCONTINUED | OUTPATIENT
Start: 2023-01-23 | End: 2023-01-23

## 2023-01-23 RX ORDER — SODIUM CHLORIDE 0.9 % (FLUSH) 0.9 %
5-40 SYRINGE (ML) INJECTION PRN
Status: DISCONTINUED | OUTPATIENT
Start: 2023-01-23 | End: 2023-01-23

## 2023-01-23 RX ORDER — MISOPROSTOL 200 UG/1
800 TABLET ORAL PRN
Status: DISCONTINUED | OUTPATIENT
Start: 2023-01-23 | End: 2023-01-23

## 2023-01-23 RX ORDER — DOCUSATE SODIUM 100 MG/1
100 CAPSULE, LIQUID FILLED ORAL 2 TIMES DAILY
Status: DISCONTINUED | OUTPATIENT
Start: 2023-01-23 | End: 2023-01-25 | Stop reason: HOSPADM

## 2023-01-23 RX ORDER — LANOLIN
CREAM (ML) TOPICAL PRN
Status: DISCONTINUED | OUTPATIENT
Start: 2023-01-23 | End: 2023-01-25 | Stop reason: HOSPADM

## 2023-01-23 RX ORDER — OXYCODONE HYDROCHLORIDE 5 MG/1
5 TABLET ORAL EVERY 4 HOURS PRN
Status: DISCONTINUED | OUTPATIENT
Start: 2023-01-23 | End: 2023-01-25 | Stop reason: HOSPADM

## 2023-01-23 RX ORDER — SODIUM CHLORIDE 9 MG/ML
INJECTION, SOLUTION INTRAVENOUS PRN
Status: DISCONTINUED | OUTPATIENT
Start: 2023-01-23 | End: 2023-01-23

## 2023-01-23 RX ORDER — SODIUM CHLORIDE, SODIUM LACTATE, POTASSIUM CHLORIDE, AND CALCIUM CHLORIDE .6; .31; .03; .02 G/100ML; G/100ML; G/100ML; G/100ML
500 INJECTION, SOLUTION INTRAVENOUS PRN
Status: DISCONTINUED | OUTPATIENT
Start: 2023-01-23 | End: 2023-01-23

## 2023-01-23 RX ORDER — ACETAMINOPHEN 500 MG
1000 TABLET ORAL EVERY 8 HOURS PRN
Status: DISCONTINUED | OUTPATIENT
Start: 2023-01-23 | End: 2023-01-25 | Stop reason: HOSPADM

## 2023-01-23 RX ORDER — SODIUM CHLORIDE 9 MG/ML
25 INJECTION, SOLUTION INTRAVENOUS PRN
Status: DISCONTINUED | OUTPATIENT
Start: 2023-01-23 | End: 2023-01-23

## 2023-01-23 RX ADMIN — SODIUM CHLORIDE, POTASSIUM CHLORIDE, SODIUM LACTATE AND CALCIUM CHLORIDE: 600; 310; 30; 20 INJECTION, SOLUTION INTRAVENOUS at 07:55

## 2023-01-23 RX ADMIN — ACETAMINOPHEN 1000 MG: 500 TABLET, FILM COATED ORAL at 20:40

## 2023-01-23 RX ADMIN — Medication 2 MILLI-UNITS/MIN: at 08:20

## 2023-01-23 RX ADMIN — ROPIVACAINE HYDROCHLORIDE 10 ML/HR: 2 INJECTION, SOLUTION EPIDURAL; INFILTRATION; PERINEURAL at 10:00

## 2023-01-23 RX ADMIN — ROPIVACAINE HYDROCHLORIDE 10 ML: 5 INJECTION, SOLUTION EPIDURAL; INFILTRATION; PERINEURAL at 09:57

## 2023-01-23 RX ADMIN — DOCUSATE SODIUM 100 MG: 100 CAPSULE ORAL at 20:40

## 2023-01-23 RX ADMIN — Medication 166.7 ML: at 13:14

## 2023-01-23 RX ADMIN — IBUPROFEN 800 MG: 800 TABLET, FILM COATED ORAL at 17:14

## 2023-01-23 ASSESSMENT — PAIN DESCRIPTION - DESCRIPTORS: DESCRIPTORS: CRAMPING

## 2023-01-23 ASSESSMENT — PAIN - FUNCTIONAL ASSESSMENT: PAIN_FUNCTIONAL_ASSESSMENT: ACTIVITIES ARE NOT PREVENTED

## 2023-01-23 ASSESSMENT — PAIN DESCRIPTION - LOCATION: LOCATION: ABDOMEN

## 2023-01-23 ASSESSMENT — PAIN SCALES - GENERAL: PAINLEVEL_OUTOF10: 5

## 2023-01-23 NOTE — PROGRESS NOTES
Pt feeling increased pressure. SVE c/c/+1. Dr Ortiz Staff notified. 1256- Pt pushing with UC.    1301-  Dr Ortiz Staff at bedside. Pt set up for delivery. 1306- , Viable male. Apgars 8/9. Wt 7-4.      1314- Placenta delivered. Pitocin infusing.

## 2023-01-23 NOTE — L&D DELIVERY NOTE
Mother's Information      Labor Events     Labor?: No  Cervical Ripening:   Now               Derick Chou [[id number]]      Labor Events     Labor?: No   Steroids?: None  Cervical Ripening Date/Time:     Antibiotics Received during Labor?: No  Rupture Date/Time: 23 10:17:00   Rupture Type: AROM  Fluid Color: Clear  Fluid Odor: None  Fluid Volume: Large  Induction: AROM  Augmentation: Oxytocin  Labor Complications: None       Anesthesia    Method: Epidural       Start Pushing      Labor onset date/time: 23 08:20:00 Now     Dilation complete date/time: 23 12:22:00 EST Now     Start pushing date/time: 2023 12:56:00   Decision date/time (emergent ):           Labor Length    1st stage: 4h 02m  2nd stage: 0h 44m  3rd stage: 0h 08m       Delivery ()      Delivery Date/Time:  23 13:06:00   Delivery Type: Vaginal, Spontaneous    Details:            [place] Presentation    Presentation: Vertex  Position: Left  _: Occiput  _: Anterior       Shoulder Dystocia    Shoulder Dystocia Present?: No  Add Second Maneuver  Add Third Maneuver  Add Fourth Maneuver  Add Fifth Maneuver  Add Sixth Maneuver  Add Seventh Maneuver  Add Eighth Maneuver  Add Ninth Maneuver       Assisted Delivery Details    Forceps Attempted?: No  Vacuum Extractor Attempted?: No       Document Additional Attempt         Document Additional Attempt                 Cord    Vessels: 3 Vessels  Complications: None  Delayed Cord Clamping?: Yes  Cord Blood Disposition: Lab  Gases Sent?: Yes       Placenta    Date/Time: 2023 13:14:35  Removal: Spontaneous  Appearance: Intact  Disposition: Discarded       Lacerations    Episiotomy: None  Perineal Lacerations: None  Other Lacerations: no non-perineal laceration       Vaginal Counts    Initial Count Personnel: Dwayne Aguirre  Initial Count Verified By: Jason Rein    Sponges Needles Instruments   Initial Counts Correct Correct Correct   Final Counts Correct Correct Correct   Final Count Personnel: Gilberto Mayo  Final Count Verified By: Sayda Banda  Accurate Final Count?: Yes  If the count is incorrect due to Intentionally Retained Foreign Object (IRFO) add the IRFO LDA in Lines/Drains. Add LDA: Link to [facility]       Blood Loss  Mother: Yulia Reynolds #[id number]     Start of Mother's Information      Delivery Blood Loss  23 0820 - 23 1319      None                 End of Mother's Information  Mother: Yulia Reynolds #[id number]                Delivery Providers    Delivering clinician: Augie Bruner MD     Provider Role    MD Peter Cannon RN Primary Nurse    Rock Mosley RN Primary [place] Nurse     [facility] Scrub Tech    Rock Mosley RN Charge Nurse              [place] Assessment    Living Status: Living     Apgar Scoring Key:    0 1 2    Skin Color: Blue or pale Acrocyanotic Completely pink    Heart Rate: Absent <100 bpm >100 bpm    Reflex Irritability: No response Grimace Cry or active withdrawal    Muscle Tone: Limp Some flexion Active motion    Respiratory Effort: Absent Weak cry; hypoventilation Good, crying                      Skin Color:   Heart Rate:   Reflex Irritability:   Muscle Tone:   Respiratory Effort: Total:            1 Minute:    0    2    2    2    2    8        Apgar 1 total from OB History    5 Minute:    1    2    2    2    2    9        Apgar 5 total from OB History    10 Minute:              15 Minute:              20 Minute:                        Apgars Assigned By: BARNDO ZAMORA RN              Resuscitation    Method: Bulb Suction, Stimulation              Measurements      Birth Weight: 3280 g               Title      Skin to Skin Initiation Date/Time:       Skin to Skin End Date/Time:                   over intact perineum without shoulder dystocia in LILI position.  Spontaneous delivery of placenta. Cervix vagina inspected no lacerations.  Bladder emptied  Excellent hemostasis and cosmesis

## 2023-01-23 NOTE — PROGRESS NOTES
Labor check  4/75 BBOW  AROM Large amount of clear fluid- head guided down  Baseline Rate: 125 bpm  Baseline Classification: Normal  Variability: Moderate  Pattern: Accelerations  Pattern Observations: Reactive  Contraction Frequency: 3-7  Contraction Duration: 80-90  Contraction Intensity: Mild    Continue induction for cholestasis of pregnancy  States itching is improved took kali this am

## 2023-01-23 NOTE — PROGRESS NOTES
Pt admitted to [address] [address] for induction of labor. IV started and labs sent. Consents witnessed.

## 2023-01-23 NOTE — PROGRESS NOTES
Alivia Bhardwaj Reveal at bedside at 4328. MARYELLEN Ingram Crews at bedside at [address] pt to sitting up on bedside at 0950. Timeout completed at 2614 with MARYELLEN MORALES and myself at bedside. Test dose given at 1000. Negative reaction. Dose given at 1005. Pt assisted to lying back in left tilt position. See anesthesia record for details. See vital sign flow sheet for BP. Tolerated procedure well.

## 2023-01-23 NOTE — ANESTHESIA PROCEDURE NOTES
Epidural Block    Patient location during procedure: OB  Start time: 1/23/2023 9:54 AM  End time: 1/23/2023 10:00 AM  Reason for block: labor epidural and at surgeon's request  Staffing  Performed: anesthesiologist   Anesthesiologist: Clint Mcpherson MD  Epidural  Patient position: sitting  Prep: ChloraPrep  Patient monitoring: frequent blood pressure checks  Approach: midline  Location: L3-4  Injection technique: JARON saline  Provider prep: mask and sterile gloves  Needle  Needle type: Tuohy   Needle gauge: 17 G  Needle insertion depth: 5.5 cm  Catheter type: multi-orifice  Catheter size: 19 G  Catheter at skin depth: 9 cm  Test dose: negativeCatheter Secured: tegaderm and tape  Assessment  Hemodynamics: stable  Attempts: 1  Outcomes: patient tolerated procedure well and uncomplicated  Preanesthetic Checklist  Completed: patient identified, IV checked, site marked, risks and benefits discussed, surgical/procedural consents, equipment checked, pre-op evaluation, timeout performed, anesthesia consent given, oxygen available and monitors applied/VS acknowledged

## 2023-01-23 NOTE — ANESTHESIA PRE PROCEDURE
Department of Anesthesiology  Preprocedure Note       Name:  Soraya Sauceda   Age:  40 y.o.  :  1985                                          MRN:  [id number]         Date:  2023      Surgeon: * No surgeons listed *    Procedure: * No procedures listed *    Medications prior to admission:   Prior to Admission medications    Medication Sig Start Date End Date Taking?  Authorizing Provider   diphenhydrAMINE (BENADRYL) 50 MG capsule Take 50 mg by mouth every 6 hours as needed for Itching    Historical Provider, MD   ursodiol (ACTIGALL) 300 MG capsule Take 2 in morning and afternoon and 1 in evening 23   Any Lane MD   valACYclovir (VALTREX) 500 MG tablet Take 1 tablet by mouth daily 22   Corrina Beal MD   NONFORMULARY Vitamin D3  Magnesium  Vitamin B6  Iron    Historical Provider, MD   Acetaminophen-Caffeine (EXCEDRIN TENSION HEADACHE) TABS Take 2 tablets by mouth every 6 hours as needed for Headaches  Patient not taking: Reported on 2023    Historical Provider, MD   Prenatal-FeFum-FA-DHA w/o A (PRENATAL + DHA PO) Take by mouth    Historical Provider, MD       Current medications:    Current Facility-Administered Medications   Medication Dose Route Frequency Provider Last Rate Last Admin    lactated ringers IV soln infusion   IntraVENous Continuous Mitzi Marinelli  mL/hr at 23 0755 New Bag at 23 0755    lactated ringers bolus  500 mL IntraVENous PRN Mitzi Marinelli MD        Or    lactated ringers bolus  1,000 mL IntraVENous PRN Mitzi Marinelli MD        sodium chloride flush 0.9 % injection 5-40 mL  5-40 mL IntraVENous 2 times per day Mitzi Marinelli MD        sodium chloride flush 0.9 % injection 5-40 mL  5-40 mL IntraVENous PRN Mitzi Marinelli MD        0.9 % sodium chloride infusion  25 mL IntraVENous PRN Mitzi Marinelli MD        oxytocin (PITOCIN) 30 units in 500 mL infusion  87.3 fiona-units/min IntraVENous Continuous PRN Kellen Brown MD        And    oxytocin (PITOCIN) 10 unit bolus from the bag  10 Units IntraVENous PRN Kellen Brown MD        lidocaine 1 % injection 30 mL  30 mL Other PRN Kellen Brown MD        butorphanol (STADOL) injection 1 mg  1 mg IntraVENous Q3H PRN Kellen Brown MD        ondansetron [facility] injection 4 mg  4 mg IntraVENous Q6H PRN Kellen Brown MD        oxytocin (PITOCIN) 30 units in 500 mL infusion  1-20 fiona-units/min IntraVENous Continuous Kellen Brown MD 6 mL/hr at 23 3092 6 fiona-units/min at 23 4691       Allergies:  No Known Allergies    Problem List:    Patient Active Problem List   Diagnosis Code    High risk pregnancy, multigravida of advanced maternal age in second trimester O09.522    Genital herpes affecting pregnancy in second trimester O80.5, A60.09    Family history of congenital heart defect Z82.79    History of anxiety Z86.59    Rh negative status during pregnancy O26.899, Z67.91    COVID-19 affecting pregnancy in second trimester O98.512, U5.3    H/O migraine during pregnancy Z86.69, Z87.59    Cholestasis during pregnancy in third trimester O26.613, K83.1    Normal labor O80, Z37.9       Past Medical History:        Diagnosis Date    Abnormal Pap smear     Anemia     Anxiety with depression     Cholestasis during pregnancy in third trimester 2023    Genital herpes, unspecified     pt denies current outbreak    Herpes gestationis     pt states she had an outbreak prior to starting Valtrex    Migraine headache 2022       Past Surgical History:        Procedure Laterality Date    DILATION AND EVACUATION OF UTERUS      MAB       Social History:    Social History     Tobacco Use    Smoking status: Former     Packs/day: 0.25     Types: Cigarettes     Quit date: 2018     Years since quittin.4    Smokeless tobacco: Never   Substance Use Topics    Alcohol use: Not Currently                                Counseling given: Not Answered      Vital Signs (Current):   Vitals:    01/23/23 1003 01/23/23 1005 01/23/23 1007 01/23/23 1008   BP: 125/75 133/64 114/64 117/64   Pulse: 78 73 81 82   Resp:       Temp:       TempSrc:       SpO2:       Weight:       Height:                                                  BP Readings from Last 3 Encounters:   01/23/23 117/64   01/19/23 122/82   01/17/23 116/79       NPO Status:                                                                                 BMI:   Wt Readings from Last 3 Encounters:   01/23/23 165 lb (74.8 kg)   01/19/23 165 lb 9.6 oz (75.1 kg)   01/17/23 166 lb (75.3 kg)     Body mass index is 30.18 kg/m². CBC:   Lab Results   Component Value Date/Time    WBC 11.9 01/23/2023 07:53 AM    RBC 4.55 01/23/2023 07:53 AM    HGB 13.5 01/23/2023 07:53 AM    HCT 39.3 01/23/2023 07:53 AM    MCV 86.4 01/23/2023 07:53 AM    RDW 13.7 01/23/2023 07:53 AM     01/23/2023 07:53 AM       CMP: No results found for: NA, K, CL, CO2, BUN, CREATININE, GFRAA, AGRATIO, LABGLOM, GLUCOSE, GLU, PROT, CALCIUM, BILITOT, ALKPHOS, AST, ALT    POC Tests: No results for input(s): POCGLU, POCNA, POCK, POCCL, POCBUN, POCHEMO, POCHCT in the last 72 hours.     Coags: No results found for: PROTIME, INR, APTT    HCG (If Applicable): No results found for: PREGTESTUR, PREGSERUM, HCG, HCGQUANT     ABGs: No results found for: PHART, PO2ART, ZXZ5YNR, XCP2FCZ, BEART, T0MAUXIE     Type & Screen (If Applicable):  No results found for: LABABO, LABRH    Drug/Infectious Status (If Applicable):  Lab Results   Component Value Date/Time    HEPCAB NONREACTIVE 07/07/2022 04:09 PM       COVID-19 Screening (If Applicable): No results found for: COVID19        Anesthesia Evaluation  Patient summary reviewed and Nursing notes reviewed no history of anesthetic complications:   Airway: Mallampati: I  TM distance: >3 FB   Neck ROM: full  Mouth opening: > = 3 FB   Dental: normal exam         Pulmonary:Negative Pulmonary ROS breath sounds clear to auscultation                             Cardiovascular:Negative CV ROS  Exercise tolerance: good (>4 METS),           Rhythm: regular  Rate: normal                    Neuro/Psych:   (+) headaches:, depression/anxiety             GI/Hepatic/Renal:   (+) GERD:,          ROS comment: Cholestasis of pregnancy . Endo/Other: Negative Endo/Other ROS                    Abdominal:             Vascular: Other Findings:           Anesthesia Plan      epidural     ASA 2             Anesthetic plan and risks discussed with patient and spouse.                         Romayne Dixons, MD   1/23/2023

## 2023-01-23 NOTE — PROGRESS NOTES
I&O cath for 250 cc urine. SVE 4/80/-2. Pt repositioned to left side on peanut ball. 1150- Pt repositioned to right side on peanut ball.

## 2023-01-24 PROCEDURE — 6360000002 HC RX W HCPCS: Performed by: OBSTETRICS & GYNECOLOGY

## 2023-01-24 PROCEDURE — 1100000000 HC RM PRIVATE

## 2023-01-24 PROCEDURE — 96372 THER/PROPH/DIAG INJ SC/IM: CPT

## 2023-01-24 PROCEDURE — 6370000000 HC RX 637 (ALT 250 FOR IP): Performed by: OBSTETRICS & GYNECOLOGY

## 2023-01-24 RX ORDER — FAMOTIDINE 20 MG/1
20 TABLET, FILM COATED ORAL 2 TIMES DAILY
Status: DISCONTINUED | OUTPATIENT
Start: 2023-01-24 | End: 2023-01-25 | Stop reason: HOSPADM

## 2023-01-24 RX ADMIN — IBUPROFEN 800 MG: 800 TABLET, FILM COATED ORAL at 11:36

## 2023-01-24 RX ADMIN — DOCUSATE SODIUM 100 MG: 100 CAPSULE ORAL at 19:42

## 2023-01-24 RX ADMIN — ACYCLOVIR 400 MG: 200 CAPSULE ORAL at 19:43

## 2023-01-24 RX ADMIN — IBUPROFEN 800 MG: 800 TABLET, FILM COATED ORAL at 19:42

## 2023-01-24 RX ADMIN — DOCUSATE SODIUM 100 MG: 100 CAPSULE ORAL at 07:58

## 2023-01-24 RX ADMIN — FAMOTIDINE 20 MG: 20 TABLET, FILM COATED ORAL at 19:42

## 2023-01-24 RX ADMIN — FAMOTIDINE 20 MG: 20 TABLET, FILM COATED ORAL at 06:22

## 2023-01-24 RX ADMIN — ACETAMINOPHEN 1000 MG: 500 TABLET, FILM COATED ORAL at 07:58

## 2023-01-24 RX ADMIN — ACETAMINOPHEN 1000 MG: 500 TABLET, FILM COATED ORAL at 23:47

## 2023-01-24 RX ADMIN — RHO(D) IMMUNE GLOBULIN (HUMAN) 300 MCG: 1500 SOLUTION INTRAMUSCULAR at 13:43

## 2023-01-24 RX ADMIN — ACETAMINOPHEN 1000 MG: 500 TABLET, FILM COATED ORAL at 15:55

## 2023-01-24 RX ADMIN — IBUPROFEN 800 MG: 800 TABLET, FILM COATED ORAL at 02:12

## 2023-01-24 RX ADMIN — Medication: at 08:22

## 2023-01-24 RX ADMIN — WITCH HAZEL: 500 SOLUTION RECTAL; TOPICAL at 08:22

## 2023-01-24 ASSESSMENT — PAIN DESCRIPTION - ORIENTATION: ORIENTATION: LOWER

## 2023-01-24 ASSESSMENT — PAIN DESCRIPTION - DESCRIPTORS: DESCRIPTORS: CRAMPING

## 2023-01-24 ASSESSMENT — PAIN SCALES - GENERAL: PAINLEVEL_OUTOF10: 3

## 2023-01-24 ASSESSMENT — PAIN DESCRIPTION - LOCATION: LOCATION: ABDOMEN

## 2023-01-24 NOTE — PROGRESS NOTES
Post-Partum Day Number 1 Progress Note    Patient doing well post-partum without significant complaint. Voiding withour difficulty, normal lochia. Pt still having mild itching- states improved  Will take ROBIN tdoay  Patient Vitals for the past 8 hrs:   BP Temp Temp src Pulse Resp SpO2   23 0745 108/72 97.2 °F (36.2 °C) Oral 55 18 97 %     Temp (24hrs), Av.9 °F (36.6 °C), Min:97.2 °F (36.2 °C), Max:98.5 °F (36.9 °C)      Vitals:      Vital signs stable, afebrile. Exam:  Patient without distress. Abdomen soft, fundus firm at level of umbilicus, nontender               Perineum with normal lochia noted. Lower extremities are negative for swelling, cords or tenderness. Labs:   Recent Results (from the past 24 hour(s))   POCT Blood Gas, Cord Blood    Collection Time: 23  1:22 PM   Result Value Ref Range    ph, Cord Blood, POC 7.39 (H) 7.15 - 7.38      PCO2, Cord Blood, POC 39 32 - 68 mmHg    PO2, Cord Blood, POC 23 mmHg    HCO3, Mixed 23.6 22 - 26 MMOL/L    SO2c, Arterial, POC 39.5 (L) 95 - 98 %    BASE DEFICIT (POC) 1.0 mmol/L    Specimen type: ARTERIAL CORD      Performed by: Misti    POCT Blood Gas, Cord Blood    Collection Time: 23  1:23 PM   Result Value Ref Range    ph, Cord Blood, POC 7.40 (H) 7.15 - 7.38      PCO2, Cord Blood, POC 37 32 - 68 mmHg    PO2, Cord Blood, POC 25 mmHg    HCO3, Venous 22.9 (L) 23 - 28 MMOL/L    SO2, VENOUS (POC) 44.4 (L) 65 - 88 %    BASE DEFICIT (POC) 1.6 mmol/L    Specimen type: VENOUS CORD      Performed by: Jono Stevens    FETAL SCREEN    Collection Time: 23  7:23 PM   Result Value Ref Range    Fetal Screen NEG     Blood Bank Comment IMMUCOR FETAL CELL SCREEN LOT [id number] EXP 2023        Assessment and Plan:  Patient appears to be having uncomplicated post-partum course. Continue routine perineal care and maternal education. Plan discharge tomorrow if no problems occur.

## 2023-01-24 NOTE — PROGRESS NOTES
Upon entering room pt tearful and states \"I feel like I was forgotten about during the night and I know I could have gotten my Tylenol. \". This RN educated pt on meaning of PRN medications and educated and wrote on pt white board when she can have pain medications. Educated pt to call for assistance at anytime and to call for her pain medications when needed. Pt voiced understanding and no longer tearful. This RN educated pt on game plan today regarding her pain medications and pt states she would like to have her Tylenol and Motrin alternated. Discussed today plan of care with pt. Bleeding precautions given. All questions encouraged upon this RN leaving the room. Pt in bed with call light in reach.

## 2023-01-24 NOTE — ANESTHESIA POSTPROCEDURE EVALUATION
Department of Anesthesiology  Postprocedure Note    Patient: Monika Meek  MRN: [id number]  YOB: 1985  Date of evaluation: 1/24/2023      Procedure Summary     Date: 01/23/23 Room / Location:     Anesthesia Start: 0950 Anesthesia Stop: 9710    Procedure: Labor Analgesia Diagnosis:     Scheduled Providers:  Responsible Provider: Celeste Mandel MD    Anesthesia Type: epidural ASA Status: 2          Anesthesia Type: No value filed. Manuel Phase I:      Manuel Phase II:        Anesthesia Post Evaluation    Patient location during evaluation: floor  Patient participation: complete - patient participated  Level of consciousness: awake and alert  Airway patency: patent  Nausea: well controlled. Complications: no  Cardiovascular status: acceptable.   Respiratory status: acceptable  Hydration status: stable

## 2023-01-24 NOTE — CARE COORDINATION
Chart reviewed - depression/anxiety. SW met with patient to complete initial assessment. Per patient, her PCP is Eli Mccabe. Patient denies any history of postpartum depression/anxiety. She confirms experiencing generalized depression/anxiety in the past, and she has formerly taken antidepressants. Patient is not currently on any psychiatric medications and feels that her depression/anxiety is currently well managed. Patient given informational packet on  mood & anxiety disorders (resources/education). Family denies any additional needs from  at this time. Family has 's contact information should any needs/questions arise.     DEV Gill, [address] [facility]   [phone]

## 2023-01-24 NOTE — LACTATION NOTE

## 2023-01-24 NOTE — LACTATION NOTE
This note was copied from a baby's chart. In to see mom and infant for the first time. Experienced mom stated that infant has been latching and nursing well. Reviewed the expectations of the first 24 hours as well as the second night. Lactation consultant will follow up tomorrow.

## 2023-01-25 VITALS
OXYGEN SATURATION: 97 % | TEMPERATURE: 97.6 F | HEIGHT: 62 IN | RESPIRATION RATE: 17 BRPM | WEIGHT: 165 LBS | HEART RATE: 75 BPM | SYSTOLIC BLOOD PRESSURE: 123 MMHG | DIASTOLIC BLOOD PRESSURE: 85 MMHG | BODY MASS INDEX: 30.36 KG/M2

## 2023-01-25 PROCEDURE — 6370000000 HC RX 637 (ALT 250 FOR IP): Performed by: OBSTETRICS & GYNECOLOGY

## 2023-01-25 RX ORDER — IBUPROFEN 800 MG/1
800 TABLET ORAL EVERY 8 HOURS PRN
Qty: 30 TABLET | Refills: 0 | Status: SHIPPED | OUTPATIENT
Start: 2023-01-25

## 2023-01-25 RX ORDER — ACETAMINOPHEN 500 MG
1000 TABLET ORAL EVERY 6 HOURS PRN
Qty: 20 TABLET | Refills: 1 | Status: SHIPPED | OUTPATIENT
Start: 2023-01-25

## 2023-01-25 RX ADMIN — DOCUSATE SODIUM 100 MG: 100 CAPSULE ORAL at 08:23

## 2023-01-25 RX ADMIN — IBUPROFEN 800 MG: 800 TABLET, FILM COATED ORAL at 03:55

## 2023-01-25 RX ADMIN — WITCH HAZEL: 500 SOLUTION RECTAL; TOPICAL at 13:53

## 2023-01-25 RX ADMIN — ACETAMINOPHEN 1000 MG: 500 TABLET, FILM COATED ORAL at 08:23

## 2023-01-25 RX ADMIN — IBUPROFEN 800 MG: 800 TABLET, FILM COATED ORAL at 12:39

## 2023-01-25 ASSESSMENT — PAIN SCALES - GENERAL: PAINLEVEL_OUTOF10: 5

## 2023-01-25 NOTE — PROGRESS NOTES
Post-Partum Day Number 2 Progress Note    Patient doing well  without significant complaints. Pain controlled on current medication. Voiding without difficulty, normal lochia. Vitals:  /85   Pulse 75   Temp 97.6 °F (36.4 °C) (Oral)   Resp 17   Ht 5' 2\" (1.575 m)   Wt 165 lb (74.8 kg)   LMP 04/30/2022   SpO2 97%   Breastfeeding Unknown   BMI 30.18 kg/m²     Vital signs stable, afebrile. Exam:  Patient without distress. Heart rrr  Lungs cta b&s               Abdomen soft, fundus firm at level of umbilicus, nontender. Lower extremities are negative for swelling, cords or tenderness. No results found for: LABABO, [address]     Lab Results   Component Value Date/Time    ABORH B NEGATIVE 01/23/2023 07:53 AM     Lab Results   Component Value Date/Time    HGB 13.5 01/23/2023 07:53 AM     Rh neg /fetal screen neg     Assessment and Plan:  Patient appears to be having uncomplicated post-partum course. Continue routine post-delivery care and maternal education. Breastfeeding.    S/p rhogam.

## 2023-01-25 NOTE — DISCHARGE SUMMARY
Obstetrical Discharge Summary     Name: Virgil Gardiner MRN: [id number]  SSN: [id number]    YOB: 1985  Age: 40 y.o. Sex: female      Allergies: Patient has no known allergies. Admit Date: 1/23/2023    Discharge Date: 1/25/2023     Admitting Physician: Nathanael Meigs, MD     Attending Physician:  Nathanael Meigs, MD     * Admission Diagnoses: Normal labor [O80, Z37.9]    * Discharge Diagnoses: Additional Diagnoses: Principal Problem:    Normal labor  Resolved Problems:    * No resolved hospital problems. *     Lab Results   Component Value Date/Time    ABORH B NEGATIVE 01/23/2023 07:53 AM      Immunization History   Administered Date(s) Administered    Tdap (Boostrix, Adacel) 03/01/2015, 11/14/2022       * Procedures:   normal spontaneous vaginal delivery         * Discharge Condition: [facility] Course: Normal hospital course following the delivery. * Disposition: Home    Discharge Medications:      Medication List        START taking these medications      acetaminophen 500 MG tablet  Commonly known as: TYLENOL  Take 2 tablets by mouth every 6 hours as needed for Pain     ibuprofen 800 MG tablet  Commonly known as: ADVIL;MOTRIN  Take 1 tablet by mouth every 8 hours as needed for Pain            CONTINUE taking these medications      diphenhydrAMINE 50 MG capsule  Commonly known as: BENADRYL     Excedrin Tension Headache Tabs  Generic drug: Acetaminophen-Caffeine     PRENATAL + DHA PO     valACYclovir 500 MG tablet  Commonly known as: Valtrex  Take 1 tablet by mouth daily            STOP taking these medications      NONFORMULARY     ursodiol 300 MG capsule  Commonly known as:  Actigall               Where to Get Your Medications        These medications were sent to [facility]/pharmacy #[id number]- [address], [address]  [facility] [address] [facility] [address]      Phone: [phone]   acetaminophen 500 MG tablet  ibuprofen 800 MG tablet         * Follow-up Care/Patient Instructions: Activity: no sex for 6 weeks  Diet: regular diet  Wound Care: as directed    Follow up: 2 weeks with dr Lizy Pace - office should call office.

## 2023-01-25 NOTE — H&P
Nereyda Zavala  [id number]      History and Physical  Subjective:     Patient is a 40 y.o.  female presents with induction for cholestasis. See office notes on prenatal care.     Lab Results   Component Value Date/Time    ABORH B NEGATIVE 2023 07:53 AM    RPREXT Non-Reactive 2014 12:00 AM               Patient Active Problem List    Diagnosis Date Noted    Normal labor 2023    Cholestasis during pregnancy in third trimester 2023    H/O migraine during pregnancy 2022    COVID-19 affecting pregnancy in second trimester 08/15/2022    Rh negative status during pregnancy 2022    High risk pregnancy, multigravida of advanced maternal age in second trimester 2022    Genital herpes affecting pregnancy in second trimester 2022    Family history of congenital heart defect 2022    History of anxiety 2016     Past Medical History:   Diagnosis Date    Abnormal Pap smear     Anemia     Anxiety with depression     Cholestasis during pregnancy in third trimester 2023    Genital herpes, unspecified     pt denies current outbreak    Herpes gestationis     pt states she had an outbreak prior to starting Valtrex    Migraine headache 2022      Past Surgical History:   Procedure Laterality Date    DILATION AND EVACUATION OF UTERUS      MAB      [unfilled]  No Known Allergies   Social History     Tobacco Use    Smoking status: Former     Packs/day: 0.25     Types: Cigarettes     Quit date: 2018     Years since quittin.5    Smokeless tobacco: Never   Substance Use Topics    Alcohol use: Not Currently      Family History   Problem Relation Age of Onset    Heart Attack Maternal Grandmother     Heart Disease Maternal Grandmother     Thyroid Disease Sister     Heart Disease Father     Heart Disease Mother     Hypertension Mother     Alzheimer's Disease Maternal Great Grandmother     No Known Problems Maternal Grandfather     No Known Problems Paternal Grandmother     Heart Attack Paternal Grandfather           Objective:     Patient Vitals for the past 8 hrs:   BP Temp Temp src Pulse Resp SpO2   01/25/23 0840 123/85 97.6 °F (36.4 °C) Oral 75 17 97 %        Fetal Monitoring:  Baseline Rate: 130 bpm  Baseline Classification: Normal  Variability: Minimal  Pattern: Accelerations  Pattern Observations: Reactive  Contraction Frequency: 1-2  Contraction Duration: 60-90  Contraction Intensity: Moderate        Assessment:     Principal Problem:    Normal labor  Resolved Problems:    * No resolved hospital problems. *      Plan:     Admit, induce AROM       Referring Physician (Optional): Ilana Fox MD

## 2023-01-25 NOTE — DISCHARGE INSTRUCTIONS
Pelvic rest for 6wk  NO driving for 2wk or while taking pain medications  NO tub baths, pools, or hot tubs for 6wk. NO push/pull motion such as vacuuming or sweeping for 2wk  NO lifting >10lb for 2wk. After Your Delivery (the Postpartum Period): Care Instructions  Overview     Congratulations on the birth of your baby. Like pregnancy, the  period can be a time of excitement, dianne, and exhaustion. You may look at your wondrous little baby and feel happy. You may also be overwhelmed by your new sleep hours and new responsibilities. At first, babies often sleep during the days and are awake at night. They do not have a pattern or routine. They may make sudden gasps, jerk themselves awake, or look like they have crossed eyes. These are all normal, and they may even make you smile. In these first weeks after delivery, try to take good care of yourself. It may take 4 to 6 weeks to feel like yourself again, and possibly longer if you had a  birth. You will likely feel very tired for several weeks. Your days will be full of ups and downs, but lots of dianne as well. Follow-up care is a key part of your treatment and safety. Be sure to make and go to all appointments, and call your doctor if you are having problems. It's also a good idea to know your test results and keep a list of the medicines you take. How can you care for yourself at home? Take care of your body after delivery  Use pads instead of tampons for the bloody flow that may last as long as 2 weeks. Ease cramps with ibuprofen (Advil, Motrin). Ease soreness of hemorrhoids and the area between your vagina and rectum with ice compresses or witch hazel pads. Ease constipation by drinking lots of fluid and eating high-fiber foods. Ask your doctor about over-the-counter stool softeners. Cleanse yourself with a gentle squeeze of warm water from a bottle instead of wiping with toilet paper.   Take a sitz bath in warm water several times a day.  Wear a good nursing bra. Ease sore and swollen breasts with warm, wet washcloths. If you aren't breastfeeding, use ice rather than heat for breast soreness. Your period may not start for several months if you are breastfeeding. You may bleed more, and longer at first, than you did before you got pregnant. Wait until you are healed (about 4 to 6 weeks) before you have sex. Ask your doctor when it is okay for you to have sex. Try not to travel with your baby for 5 or 6 weeks. If you take a long car trip, make frequent stops to walk around and stretch. Avoid exhaustion  Rest every day. Try to nap when your baby naps. Ask another adult to be with you for a few days after delivery. Plan for  if you have other children. Stay flexible so you can eat at odd hours and sleep when you need to. Both you and your baby are making new schedules. Plan small trips to get out of the house. Change can make you feel less tired. Ask for help with housework, cooking, and shopping. Remind yourself that your job is to care for your baby. Know about help for postpartum depression  \"Baby blues\" are common for the first 1 to 2 weeks after birth. You may cry or feel sad or irritable for no reason. Rest whenever you can. Being tired makes it harder to handle your emotions. Go for walks with your baby. Talk to your partner, friends, and family about your feelings. If your symptoms last for more than a few weeks, or if you feel very depressed, ask your doctor for help. Postpartum depression can be treated. Support groups and counseling can help. Sometimes medicine can also help. Stay healthy  Eat healthy foods so you have more energy. If you breastfeed, avoid drugs. If you quit smoking during pregnancy, try to stay smoke-free. If you choose to have a drink now and then, have only one drink, and limit the number of occasions that you have a drink.  Wait to breastfeed at least 2 hours after you have a drink to reduce the amount of alcohol the baby may get in the milk. Start daily exercise after 4 to 6 weeks, but rest when you feel tired. Learn exercises to tone your belly. Try Kegel exercises to regain strength in your pelvic muscles. You can do these exercises while you stand or sit. (If doing these exercises causes pain, stop doing them and talk with your doctor.)  Squeeze your muscles as if you were trying not to pass gas. Or squeeze your muscles as if you were stopping the flow of urine. Your belly, legs, and buttocks shouldn't move. Hold the squeeze for 3 seconds, then relax for 5 to 10 seconds. Start with 3 seconds, then add 1 second each week until you are able to squeeze for 10 seconds. Repeat the exercise 10 times a session. Do 3 to 8 sessions a day. Find a class for you and your baby that has an exercise time. If you had a  birth, give yourself a bit more time before you exercise, and be careful. When should you call for help? Share this information with your partner, family, or a friend. They can help you watch for warning signs. Call 911  anytime you think you may need emergency care. For example, call if:    You have thoughts of harming yourself, your baby, or another person. You passed out (lost consciousness). You have chest pain, are short of breath, or cough up blood. You have a seizure. Call your doctor now or seek immediate medical care if:    You have signs of hemorrhage (too much bleeding), such as:  Heavy vaginal bleeding. This means that you are soaking through one or more pads in an hour. Or you pass blood clots bigger than an egg. Feeling dizzy or lightheaded, or you feel like you may faint. Feeling so tired or weak that you cannot do your usual activities. A fast or irregular heartbeat. New or worse belly pain. You have signs of infection, such as:  A fever. Vaginal discharge that smells bad. New or worse belly pain.      You have symptoms of a blood clot in your leg (called a deep vein thrombosis), such as:  Pain in the calf, back of the knee, thigh, or groin. Redness and swelling in your leg or groin. You have signs of preeclampsia, such as:  Sudden swelling of your face, hands, or feet. New vision problems (such as dimness, blurring, or seeing spots). A severe headache. Watch closely for changes in your health, and be sure to contact your doctor if:    Your vaginal bleeding isn't decreasing. You feel sad, anxious, or hopeless for more than a few days. You are having problems with your breasts or breastfeeding. Where can you learn more? Go to http://www.woods.com/ and enter A461 to learn more about \"After Your Delivery (the Postpartum Period): Care Instructions. \"  Current as of: February 23, 2022               Content Version: 13.5  © 5927-6487 [employer]. Care instructions adapted under license by [facility] ([facility]). If you have questions about a medical condition or this instruction, always ask your healthcare professional. [place] [address] any warranty or liability for your use of this information.

## 2023-02-06 ENCOUNTER — POSTPARTUM VISIT (OUTPATIENT)
Dept: OBGYN CLINIC | Age: 38
End: 2023-02-06
Payer: COMMERCIAL

## 2023-02-06 VITALS
HEIGHT: 62 IN | SYSTOLIC BLOOD PRESSURE: 105 MMHG | DIASTOLIC BLOOD PRESSURE: 64 MMHG | BODY MASS INDEX: 28.93 KG/M2 | WEIGHT: 157.2 LBS

## 2023-02-06 DIAGNOSIS — Z30.41 ORAL CONTRACEPTIVE PILL SURVEILLANCE: ICD-10-CM

## 2023-02-06 RX ORDER — ACETAMINOPHEN AND CODEINE PHOSPHATE 120; 12 MG/5ML; MG/5ML
1 SOLUTION ORAL DAILY
Qty: 90 TABLET | Refills: 1 | Status: SHIPPED | OUTPATIENT
Start: 2023-02-06

## 2023-02-06 NOTE — PROGRESS NOTES
2 Week Postpartum Visit    Name: Lois Hansen    Date: 2023    Age: 40 y.o.    OB History          4    Para   3    Term   3            AB   1    Living   3         SAB   1    IAB        Ectopic        Molar        Multiple   0    Live Births   3              /64   Ht 5' 2\" (1.575 m)   Wt 157 lb 3.2 oz (71.3 kg)   LMP 2022        Delivered by Dr. Brenda Delaeny on 23 by . Infant's Name: Yaa Plummer Infant's Gender: Male  Birth Weight: 7lb 3.7oz Feeding: breast  Desired Contraception: tubal ligation    Last pap: 22 - Nilm, HPV negative, CT/GN/TV neg      Pt wants to know if she is able to take a bath since she did not have any tears during delivery. Reports bleeding is minimal and just has spotting. Current Outpatient Medications   Medication Sig Dispense Refill    norethindrone (ORTHO MICRONOR) 0.35 MG tablet Take 1 tablet by mouth daily 90 tablet 1    valACYclovir (VALTREX) 500 MG tablet Take 1 tablet by mouth daily 30 tablet 3    Acetaminophen-Caffeine (EXCEDRIN TENSION HEADACHE) TABS Take 2 tablets by mouth every 6 hours as needed for Headaches       No current facility-administered medications for this visit. Physical Exam  OBGyn Exam     Moods stable  Lochia appropriate  Breastfeeding going well with minimal discomfort  Desires BTL- will send message to . Micronor sent to pharmacy for now.     Not cleared for sexual activity at this time    RTO in 4 weeks for 6w pp visit    Sheba Cevallos MD

## 2023-03-03 NOTE — PROGRESS NOTES
6 Week Postpartum Visit    Name: Homero Fill    Date: 3/3/2023    Age: 40 y.o.    OB History          4    Para   3    Term   3            AB   1    Living   3         SAB   1    IAB        Ectopic        Molar        Multiple   0    Live Births   3              LMP 2022    Scheduled for tubal ligation  Patient Active Problem List    Diagnosis Date Noted    Normal labor 2023    Cholestasis during pregnancy in third trimester 2023    H/O migraine during pregnancy 2022    COVID-19 affecting pregnancy in second trimester 08/15/2022    Rh negative status during pregnancy 2022    High risk pregnancy, multigravida of advanced maternal age in second trimester 2022    Genital herpes affecting pregnancy in second trimester 2022    Family history of congenital heart defect 2022    History of anxiety 2016      Delivered by Dr. Juan Miguel Carver on 2023 by , no lacerations. Infant's Name: Too Quinn Infant's Gender: male  Birth Weight: 7 lb 3.7 oz Feeding: breast milk feed exclusively   Desired Contraception: tubal ligation scheduled 2023  Last Pap smear date/result: 2022 Neg, HPV Neg, CT/GN/TV neg      Current Outpatient Medications   Medication Sig Dispense Refill    norethindrone (ORTHO MICRONOR) 0.35 MG tablet Take 1 tablet by mouth daily 90 tablet 1    valACYclovir (VALTREX) 500 MG tablet Take 1 tablet by mouth daily 30 tablet 3    Acetaminophen-Caffeine (EXCEDRIN TENSION HEADACHE) TABS Take 2 tablets by mouth every 6 hours as needed for Headaches       No current facility-administered medications for this visit. [email or web address]    Physical Exam  Physical Exam    tubal  Micronor until tubal  Mood bright  Fundus firm    There are no diagnoses linked to this encounter. No follow-up provider specified.     Daniella Daniels RN

## 2023-03-06 ENCOUNTER — POSTPARTUM VISIT (OUTPATIENT)
Dept: OBGYN CLINIC | Age: 38
End: 2023-03-06

## 2023-03-06 VITALS
HEIGHT: 62 IN | DIASTOLIC BLOOD PRESSURE: 70 MMHG | SYSTOLIC BLOOD PRESSURE: 118 MMHG | BODY MASS INDEX: 29.59 KG/M2 | WEIGHT: 160.8 LBS

## 2023-03-30 ENCOUNTER — OFFICE VISIT (OUTPATIENT)
Dept: OBGYN CLINIC | Age: 38
End: 2023-03-30

## 2023-03-30 VITALS — WEIGHT: 162 LBS | BODY MASS INDEX: 29.81 KG/M2 | HEIGHT: 62 IN

## 2023-03-30 DIAGNOSIS — Z01.818 ENCOUNTER FOR PRE-OPERATIVE EXAMINATION: Primary | ICD-10-CM

## 2023-03-30 RX ORDER — IBUPROFEN 800 MG/1
800 TABLET ORAL EVERY 8 HOURS PRN
Qty: 90 TABLET | Refills: 0 | Status: SHIPPED | OUTPATIENT
Start: 2023-03-30

## 2023-03-30 RX ORDER — ONDANSETRON 4 MG/1
4 TABLET, FILM COATED ORAL DAILY PRN
Qty: 30 TABLET | Refills: 0 | Status: SHIPPED | OUTPATIENT
Start: 2023-03-30

## 2023-03-30 RX ORDER — OXYCODONE HYDROCHLORIDE 5 MG/1
5 TABLET ORAL EVERY 6 HOURS PRN
Qty: 20 TABLET | Refills: 0 | Status: SHIPPED | OUTPATIENT
Start: 2023-03-30 | End: 2023-04-06

## 2023-03-30 NOTE — PROGRESS NOTES
Preop Visit    Ms. Effie Bowden presents for a preop visit. She is scheduled for a LAPAROSCOPY,SURGICAL; WITH REMOVAL OF ADNEXAL STRUCTURES (PARTIAL/TOTAL OOPHERECTOMY AND/OR SALPINGECTOMY)-STERILIZATION on 4/25/23. Her history, meds, and allergies were reviewed. The procedure was reviewed in detail as well as the risks of bleeding, infection, DVT and potential surgical complications involving the bladder, ureters, colon or intestines. Also the alternatives,  benefits, recovery and follow-up. Prevention of SSI discussed as indicated. All of her questions were answered. Exam:  Lungs CTAB  Heart RRR    See the hospital H&P as well as prior chart for details.     Silvestre Coronel MD  [facility]

## 2023-04-24 ENCOUNTER — PATIENT MESSAGE (OUTPATIENT)
Dept: OBGYN CLINIC | Age: 38
End: 2023-04-24

## 2023-04-24 NOTE — TELEPHONE ENCOUNTER
Tried to call pt, but mailbox was full.  Surgery time is moved up to 1230, and pt needs to arrive at 1045

## 2023-04-26 DIAGNOSIS — Z01.818 ENCOUNTER FOR PRE-OPERATIVE EXAMINATION: ICD-10-CM

## 2023-04-26 RX ORDER — IBUPROFEN 800 MG/1
800 TABLET ORAL EVERY 8 HOURS PRN
Qty: 90 TABLET | Refills: 0 | OUTPATIENT
Start: 2023-04-26

## 2023-05-08 NOTE — PROGRESS NOTES
Effie presents for postop visit from [address]; WITH REMOVAL OF ADNEXAL STRUCTURES (PARTIAL/TOTAL OOPHERECTOMY AND/OR SALPINGECTOMY)-STERILIZATION on 4/25/23. about 2 weeks ago. Doing well postoperatively. Intermittent light spotting - pt reports she is still breast feeding. Fever: No Voiding well: Yes. Bowel movements OK: Yes. Exam: A&OX3, NAD. Abdomen:  Non tender Incisions clean, dry, intact    Discussed pathology report which was normal bilateral fallopian tubes. A/P. Stable Post op condition. Gradually increase activity. Resumption of sexual activity is  encouraged at this time. Follow up PRN.     Justyna Burch MD  [facility]

## 2023-05-09 ENCOUNTER — OFFICE VISIT (OUTPATIENT)
Dept: OBGYN CLINIC | Age: 38
End: 2023-05-09

## 2023-05-09 VITALS
WEIGHT: 165 LBS | BODY MASS INDEX: 30.36 KG/M2 | SYSTOLIC BLOOD PRESSURE: 126 MMHG | DIASTOLIC BLOOD PRESSURE: 70 MMHG | HEIGHT: 62 IN

## 2023-05-09 DIAGNOSIS — Z98.890 POST-OPERATIVE STATE: Primary | ICD-10-CM

## 2023-05-09 PROCEDURE — 99024 POSTOP FOLLOW-UP VISIT: CPT | Performed by: STUDENT IN AN ORGANIZED HEALTH CARE EDUCATION/TRAINING PROGRAM

## 2023-06-06 NOTE — PROGRESS NOTES
Effie presents for postop visit from [address]; WITH REMOVAL OF ADNEXAL STRUCTURES (PARTIAL/TOTAL OOPHERECTOMY AND/OR SALPINGECTOMY)-STERILIZATION on 4/25/23. about 6 weeks ago. Doing well postoperatively. Fever: No Voiding well: Yes. Bowel movements OK: Yes. Normal post op exam 5/9/23. Pt reports she has had some itchiness around Rt incision site and feels a poking sensation when she touches it. Exam: A&OX3, NAD. Abdomen:  Non tender Incisions clean, dry, intact, small amount of suture noted on right-sided incision. Discussed pathology report which was normal bilateral fallopian tubes. A/P. Stable Post op condition. Gradually increase activity. Resumption of sexual activity is  encouraged at this time. Suture removed without complications. RTO PRN.     Alexandria Dent MD  [facility]

## 2023-06-07 ENCOUNTER — OFFICE VISIT (OUTPATIENT)
Dept: OBGYN CLINIC | Age: 38
End: 2023-06-07

## 2023-06-07 VITALS
WEIGHT: 169 LBS | BODY MASS INDEX: 31.1 KG/M2 | SYSTOLIC BLOOD PRESSURE: 118 MMHG | HEIGHT: 62 IN | DIASTOLIC BLOOD PRESSURE: 76 MMHG

## 2023-06-07 DIAGNOSIS — Z98.890 POST-OPERATIVE STATE: Primary | ICD-10-CM

## 2023-06-07 PROCEDURE — 99024 POSTOP FOLLOW-UP VISIT: CPT | Performed by: STUDENT IN AN ORGANIZED HEALTH CARE EDUCATION/TRAINING PROGRAM
